# Patient Record
Sex: MALE | Race: AMERICAN INDIAN OR ALASKA NATIVE
[De-identification: names, ages, dates, MRNs, and addresses within clinical notes are randomized per-mention and may not be internally consistent; named-entity substitution may affect disease eponyms.]

---

## 2017-12-11 ENCOUNTER — HOSPITAL ENCOUNTER (INPATIENT)
Dept: HOSPITAL 31 - C.ER | Age: 22
LOS: 1 days | Discharge: HOME | DRG: 889 | End: 2017-12-12
Attending: HOSPITALIST | Admitting: HOSPITALIST
Payer: MEDICAID

## 2017-12-11 VITALS — RESPIRATION RATE: 20 BRPM

## 2017-12-11 VITALS — OXYGEN SATURATION: 98 %

## 2017-12-11 VITALS — BODY MASS INDEX: 37.1 KG/M2

## 2017-12-11 DIAGNOSIS — F80.9: ICD-10-CM

## 2017-12-11 DIAGNOSIS — R71.8: ICD-10-CM

## 2017-12-11 DIAGNOSIS — E87.6: ICD-10-CM

## 2017-12-11 DIAGNOSIS — Z91.14: ICD-10-CM

## 2017-12-11 DIAGNOSIS — F12.90: ICD-10-CM

## 2017-12-11 DIAGNOSIS — I49.9: ICD-10-CM

## 2017-12-11 DIAGNOSIS — F17.210: ICD-10-CM

## 2017-12-11 DIAGNOSIS — G40.409: Primary | ICD-10-CM

## 2017-12-11 DIAGNOSIS — Z79.899: ICD-10-CM

## 2017-12-11 LAB
ALBUMIN/GLOB SERPL: 1.4 {RATIO} (ref 1–2.1)
ALP SERPL-CCNC: 58 U/L (ref 38–126)
ALT SERPL-CCNC: 34 U/L (ref 21–72)
AST SERPL-CCNC: 27 U/L (ref 17–59)
BASE EXCESS BLDV CALC-SCNC: -2.7 MMOL/L (ref 0–2)
BASOPHILS # BLD AUTO: 0 K/UL (ref 0–0.2)
BASOPHILS NFR BLD: 0.3 % (ref 0–2)
BILIRUB SERPL-MCNC: 0.5 MG/DL (ref 0.2–1.3)
BILIRUB UR-MCNC: NEGATIVE MG/DL
BUN SERPL-MCNC: 14 MG/DL (ref 9–20)
BUN SERPL-MCNC: 15 MG/DL (ref 9–20)
CALCIUM SERPL-MCNC: 8.4 MG/DL (ref 8.6–10.4)
CALCIUM SERPL-MCNC: 8.7 MG/DL (ref 8.6–10.4)
CHLORIDE SERPL-SCNC: 107 MMOL/L (ref 98–107)
CHLORIDE SERPL-SCNC: 108 MMOL/L (ref 98–107)
CO2 SERPL-SCNC: 25 MMOL/L (ref 22–30)
CO2 SERPL-SCNC: 27 MMOL/L (ref 22–30)
EOSINOPHIL # BLD AUTO: 0.2 K/UL (ref 0–0.7)
EOSINOPHIL NFR BLD: 1.4 % (ref 0–4)
ERYTHROCYTE [DISTWIDTH] IN BLOOD BY AUTOMATED COUNT: 12 % (ref 11.5–14.5)
ETHANOL SERPL-MCNC: < 10 MG/DL (ref 0–10)
GLOBULIN SER-MCNC: 2.7 GM/DL (ref 2.2–3.9)
GLUCOSE SERPL-MCNC: 100 MG/DL (ref 75–110)
GLUCOSE SERPL-MCNC: 93 MG/DL (ref 75–110)
GLUCOSE UR STRIP-MCNC: NORMAL MG/DL
HCT VFR BLD CALC: 40.9 % (ref 35–51)
KETONES UR STRIP-MCNC: NEGATIVE MG/DL
LEUKOCYTE ESTERASE UR-ACNC: (no result) LEU/UL
LYMPHOCYTES # BLD AUTO: 2.3 K/UL (ref 1–4.3)
LYMPHOCYTES NFR BLD AUTO: 19.4 % (ref 20–40)
MCH RBC QN AUTO: 33.9 PG (ref 27–31)
MCHC RBC AUTO-ENTMCNC: 33.1 G/DL (ref 33–37)
MCV RBC AUTO: 102.2 FL (ref 80–94)
MONOCYTES # BLD: 0.3 K/UL (ref 0–0.8)
MONOCYTES NFR BLD: 3 % (ref 0–10)
NRBC BLD AUTO-RTO: 0.1 % (ref 0–2)
PCO2 BLDV: 40 MMHG (ref 40–60)
PH BLDV: 7.36 [PH] (ref 7.32–7.43)
PH UR STRIP: 8 [PH] (ref 5–8)
PLATELET # BLD: 311 K/UL (ref 130–400)
PMV BLD AUTO: 8.5 FL (ref 7.2–11.7)
POTASSIUM SERPL-SCNC: 2.9 MMOL/L (ref 3.6–5.2)
POTASSIUM SERPL-SCNC: 4.2 MMOL/L (ref 3.6–5.2)
PROT SERPL-MCNC: 6.5 G/DL (ref 6.3–8.3)
PROT UR STRIP-MCNC: NEGATIVE MG/DL
RBC # UR STRIP: NEGATIVE /UL
SODIUM SERPL-SCNC: 139 MMOL/L (ref 132–148)
SODIUM SERPL-SCNC: 143 MMOL/L (ref 132–148)
SP GR UR STRIP: 1.02 (ref 1–1.03)
UROBILINOGEN UR-MCNC: 4 MG/DL (ref 0.2–1)
WBC # BLD AUTO: 11.7 K/UL (ref 4.8–10.8)
WBC #/AREA URNS HPF: < 1 /HPF (ref 0–5)

## 2017-12-11 RX ADMIN — MAGNESIUM SULFATE IN DEXTROSE SCH MLS/HR: 10 INJECTION, SOLUTION INTRAVENOUS at 09:06

## 2017-12-11 RX ADMIN — MAGNESIUM SULFATE IN DEXTROSE SCH MLS/HR: 10 INJECTION, SOLUTION INTRAVENOUS at 11:41
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
## 2017-12-11 NOTE — CARD
--------------- APPROVED REPORT --------------





EKG Measurement

Heart [id number]

AL [id number]

[id number]

[id number]

[id number]



<Conclusion>

Sinus rhythm with marked sinus arrhythmia

Otherwise normal ECG

## 2017-12-11 NOTE — MRI
PROCEDURE:  Magnetic Resonance Angiography Brain



HISTORY:

Seizures



COMPARISON:

None available. 



TECHNIQUE:

3D time of flight MR angiography of the intracranial arteries was 

performed. Rotating maximum intensity projection images were 

generated.



FINDINGS:



INTERNAL CAROTID ARTERIES:

Normal in caliber and widely patent. The skull base, petrous, 

cavernous and supraclinoid segments are bilaterally widely patient. 



ANTERIOR CEREBRAL ARTERIES:

Normal in caliber and widely patent. A1 and A2 segments are widely 

patent. Smaller distal branches unremarkable, as visualized.



MIDDLE CEREBRAL ARTERIES:

Normal in caliber and widely patent. M1 and M2 segments are widely 

patent. Perisylvian branches grossly symmetric.



POSTERIOR CIRCULATION:

Basilar Artery: Normal in caliber and widely patent.



Distal Vertebral Arteries: Normal in caliber and widely patent.



Posterior Cerebral Arteries: Normal in caliber and widely patent.



Posterior Inferior Cerebellar Arteries: Normal in caliber and widely 

patent.



ANEURYSM/ VASCULAR MALFORMATIONS:

None.



OTHER FINDINGS:

None. 



IMPRESSION:

Normal MR angiography of the brain.

## 2017-12-11 NOTE — C.PDOC
History Of Present Illness


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 

collapse. Patient notes he was sick a few days ago and seen at a satellite ER 

for a complaint of dizziness and headache; he had a negative work up which 

included blood work and a head CT. On arrival to ER, patient is lethargic but 

able to answer questions appropriately; states he does not remember the event. 

While in the ER patient had a generalized tonic clonic seizure, 2ml of ativan 

administered. 


Time Seen by Provider: 12/11/17 01:56


Chief Complaint (Nursing): Syncope


History Per: Patient


History/Exam Limitations: no limitations


Onset/Duration Of Symptoms: Hrs


Current Symptoms Are (Timing): Still Present


Number Of Syncopal Episodes: 1


Activity At Onset Of Symptoms: Standing


Associated Symptoms Preceding Syncopal Episode: No Predromal Symptoms (Sudden 

Onset)


Seizure Or Post-ictal Symptoms: Generalized Seizure Activity, Post-ictal Period


Possible Causative Factor(s): Other (Not known)


Fall Associated With With Symptoms: Yes, No Injury As Result Of Fall


Severity: Moderate


Pain Scale Rating Of: 5


Recent travel outside of the [place]: No





- Symptoms Of CVA


Associated Symptoms: denies: Impaired Speech, Seizure Activity, New Vision 

Deficit(Left), New Vision Deficit(Right), Decreased Ability To Walk, New 

Confusion





Past Medical History


Reviewed: Historical Data, Nursing Documentation, Vital Signs


Vital Signs: 


 Last Vital Signs











Temp  97.5 F L  12/11/17 01:54


 


Pulse  64   12/11/17 03:03


 


Resp  16   12/11/17 03:03


 


BP  111/73   12/11/17 03:03


 


Pulse Ox  100   12/11/17 03:03














- Medical History


PMH: Seizures


Surgical History: No Surg Hx


Family History: States: No Known Family Hx





- Social History


Hx Alcohol Use: No


Hx Substance Use: No





- Immunization History


Hx Tetanus Toxoid Vaccination: No


Hx Influenza Vaccination: No


Hx Pneumococcal Vaccination: No





Review Of Systems


Constitutional: Positive for: Other (Lethargic).  Negative for: Fever, Chills


Eyes: Negative for: Redness


Cardiovascular: Negative for: Chest Pain


Respiratory: Negative for: Shortness of Breath


Gastrointestinal: Negative for: Vomiting


Genitourinary: Negative for: Incontinence


Musculoskeletal: Negative for: Back Pain


Skin: Negative for: Rash


Neurological: Positive for: Seizures


Psych: Negative for: Anxiety





Physical Exam





- Physical Exam


Appears: Non-toxic, Other (No evidence of trauma)


Skin: Warm, Dry


Head: Normacephalic


Eye(s): bilateral: Normal Inspection, PERRL, EOMI


Oral Mucosa: Moist


Neck: Trachea Midline, Supple


Chest: Symmetrical, No Tenderness


Cardiovascular: Rhythm Regular


Respiratory: No Rales, No Rhonchi, No Wheezing


Gastrointestinal/Abdominal: Soft, No Tenderness


Back: Normal Inspection


Extremity: Normal ROM


Extremity: Bilateral: Atraumatic


Pulses: Left Dorsalis Pedis: Normal, Right Dorsalis Pedis: Normal


Neurological/Psych: Oriented x3, Slow To Respond With Command


Gait: Unable To Assess





ED Course And Treatment





- Laboratory Results


Result Diagrams: 


 12/11/17 02:16





 12/11/17 02:46


ECG: Interpreted By Me, Viewed By Me


ECG Rhythm: Sinus Rhythm (66), Nonspecific Changes


O2 Sat by Pulse Oximetry: 97


Pulse Ox Interpretation: Normal





- Radiology


CXR: Interpreted by Me, Viewed By Me


CXR Interpretation: No: Infiltrates, Fracture, Pnemothorax


Progress Note: Blood work, urinalysis, CXR, and EKG ordered. IV fluids 

administered.





Critical Care Time





- Critical Care Note


Total Time (in mins): 30


Documented critical care: time excludes all time spent performing seperately 

billable procedures.





Disposition


Discussed With DrRoberta: William Nugent


Comment: accepted the pt on his servoice and took over the care at 3:46 AM


Doctor Will See Patient In The: ED


Counseled Patient/Family Regarding: Studies Performed, Diagnosis





- Disposition


Disposition: HOSPITALIZED


Disposition Time: 01:56


Condition: FAIR


Forms:  [facility] (English)





- POA


Present On Arrival: None





- Clinical Impression


Clinical Impression: 


 Dizziness, Seizure








- Scribe Statement


The provider has reviewed the documentation as recorded by the Scribe





Mario Munoz





All medical record entries made by the Scribe were at my direction and 

personally dictated by me. I have reviewed the chart and agree that the record 

accurately reflects my personal performance of the history, physical exam, 

medical decision making, and the department course for this patient. I have 

also personally directed, reviewed, and agree with the discharge instructions 

and disposition.





Decision To Admit





- Pt Status Changed To:


Hospital Disposition Of: Inpatient





- Admit Certification


Admit to Inpatient:: After my assessment, the patient will require 

hospitalization for at least two midnights.  This is because of the severity of 

symptoms shown, intensity of services needed, and/or the medical risk in this 

patient being treated as an outpatient.





- InPatient:


Physician Admission Certification: I certify that this patient requires 2 or 

more midnights of care for the following reason:: After my assessment, the 

patient will require hospitalization for at least two midnights.  This is 

because of the severity of symptoms shown, intensity of services needed, and/or 

the medical risk in this patient being treated as an outpatient.





- .


Bed Request Type: Regular


Admitting Physician: William Nugent


Patient Diagnosis: 


 Dizziness, Seizure
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
## 2017-12-11 NOTE — MRI
PROCEDURE:  MRI BRAIN WITHOUT CONTRAST



HISTORY:

seizure



COMPARISON:

Noncontrast head CT from 12/11/2017. 



TECHNIQUE:

Multiplanar, multisequence MR images of the brain were obtained 

without intravenous contrast enhancement.



FINDINGS:



HEMORRHAGE:

None



DWI:

No evidence of an acute or early subacute infarction.



BRAIN PARENCHYMA:

Gray-white matter differentiation is preserved.  There is no mass, 

mass effect or abnormal extra-axial fluid collection. The midline 

sagittal structures are normal. There is no evidence of mesial 

temporal sclerosis. 



VENTRICLES:

The ventricles are normal in size, shape and configuration.



CRANIUM:

There is normal bone marrow signal pattern.



ORBITS:

Grossly unremarkable.



PARANASAL SINUSES/MASTOIDS:

There is mild mucosal thickening in the paranasal sinuses.  The 

mastoid air cells are clear P



VASCULAR SYSTEM:

There are normal signal voids in the larger intracranial arteries.



OTHER FINDINGS:

None. 



IMPRESSION:

No acute intracranial abnormality.

## 2017-12-11 NOTE — RAD
PROCEDURE:  CHEST RADIOGRAPH, 1 VIEW



HISTORY:

Seizure



COMPARISON:

09/11/2015.



FINDINGS:



LUNGS:

The lungs are well inflated and clear.



PLEURA:

No pneumothorax or pleural fluid seen.



CARDIOVASCULAR:

Normal.



OSSEOUS STRUCTURES:

No significant abnormalities.



VISUALIZED UPPER ABDOMEN:

Normal.



OTHER FINDINGS:

None. 



IMPRESSION:

No active pulmonary disease.

## 2017-12-11 NOTE — CT
PROCEDURE:  CT HEAD WITHOUT CONTRAST.



HISTORY:

seizure, fall



COMPARISON:

None available. 



TECHNIQUE:

Axial computed tomography images were obtained through the head/brain 

without intravenous contrast.  



Radiation dose:



Total exam DLP = 1016 mGy-cm.



This CT exam was performed using one or more of the following dose 

reduction techniques: Automated exposure control, adjustment of the 

mA and/or kV according to patient size, and/or use of iterative 

reconstruction technique.



FINDINGS:



HEMORRHAGE:

No intracranial hemorrhage. 



BRAIN:

No mass effect or edema.  No atrophy or chronic microvascular 

ischemic changes.



VENTRICLES:

Unremarkable. No hydrocephalus. 



CALVARIUM:

Unremarkable.



PARANASAL SINUSES:

Small left frontal sinus retention cyst or polyp.



MASTOID AIR CELLS:

Unremarkable as visualized. No inflammatory changes.



OTHER FINDINGS:

Opacities in the bilateral external auditory canals which may be 

cerumen which can be confirmed clinically.



IMPRESSION:

No evidence for acute intracranial abnormality.



Additional findings as above.



These findings were preliminarily reported at 6:30 a.m. 12/11/2017 by 

Dr. Chen Bone from [facility].

## 2017-12-12 VITALS — TEMPERATURE: 97.9 F | SYSTOLIC BLOOD PRESSURE: 115 MMHG | HEART RATE: 55 BPM | DIASTOLIC BLOOD PRESSURE: 65 MMHG

## 2017-12-12 LAB
ALBUMIN/GLOB SERPL: 1 {RATIO} (ref 1–2.1)
ALP SERPL-CCNC: 60 U/L (ref 38–126)
ALT SERPL-CCNC: 29 U/L (ref 21–72)
AST SERPL-CCNC: 21 U/L (ref 17–59)
BASOPHILS # BLD AUTO: 0 K/UL (ref 0–0.2)
BASOPHILS NFR BLD: 0.3 % (ref 0–2)
BILIRUB SERPL-MCNC: 0.5 MG/DL (ref 0.2–1.3)
BUN SERPL-MCNC: 11 MG/DL (ref 9–20)
CALCIUM SERPL-MCNC: 8.8 MG/DL (ref 8.6–10.4)
CHLORIDE SERPL-SCNC: 108 MMOL/L (ref 98–107)
CO2 SERPL-SCNC: 30 MMOL/L (ref 22–30)
EOSINOPHIL # BLD AUTO: 0.1 K/UL (ref 0–0.7)
EOSINOPHIL NFR BLD: 0.4 % (ref 0–4)
ERYTHROCYTE [DISTWIDTH] IN BLOOD BY AUTOMATED COUNT: 12 % (ref 11.5–14.5)
GLOBULIN SER-MCNC: 3.8 GM/DL (ref 2.2–3.9)
GLUCOSE SERPL-MCNC: 81 MG/DL (ref 75–110)
HCT VFR BLD CALC: 40.1 % (ref 35–51)
LYMPHOCYTES # BLD AUTO: 2.2 K/UL (ref 1–4.3)
LYMPHOCYTES NFR BLD AUTO: 14.3 % (ref 20–40)
MAGNESIUM SERPL-MCNC: 1.7 MG/DL (ref 1.6–2.3)
MCH RBC QN AUTO: 33.6 PG (ref 27–31)
MCHC RBC AUTO-ENTMCNC: 32.7 G/DL (ref 33–37)
MCV RBC AUTO: 102.7 FL (ref 80–94)
MONOCYTES # BLD: 0.8 K/UL (ref 0–0.8)
MONOCYTES NFR BLD: 5.2 % (ref 0–10)
NRBC BLD AUTO-RTO: 0 % (ref 0–2)
PHOSPHATE SERPL-MCNC: 3.6 MG/DL (ref 2.5–4.5)
PLATELET # BLD: 295 K/UL (ref 130–400)
PMV BLD AUTO: 8.7 FL (ref 7.2–11.7)
POTASSIUM SERPL-SCNC: 3.9 MMOL/L (ref 3.6–5.2)
PROT SERPL-MCNC: 7.5 G/DL (ref 6.3–8.3)
SODIUM SERPL-SCNC: 139 MMOL/L (ref 132–148)
WBC # BLD AUTO: 15.5 K/UL (ref 4.8–10.8)

## 2017-12-12 NOTE — CP.PCM.CON
History of Present Illness





- History of Present Illness


History of Present Illness: 





CONSULT DICTATED 


REC SEIZURE - NON COMPLIANCE


RESUME KEPPRA 750 MG BID 


NEEDS AMB VIDEO EEG AS AN OP 


MED STABLE D/C AND FOLLOW UP AS AN OP 


ABSTINENCE TOB AND CANABINOIDS





Past Patient History





- Infectious Disease


Hx of Infectious Diseases: None





- Past Medical History & Family History


Past Medical History?: Yes





- Past Social History


Smoking Status: Heavy Smoker > 10 Cigarettes Daily





- CARDIAC


Hx Cardiac Disorders: No


Hx Hypertension: No





- PULMONARY


Hx Respiratory Disorders: No


Hx Tuberculosis: No





- NEUROLOGICAL


Hx Neurological Disorder: Yes


Hx Seizures: Yes





- HEENT


Hx HEENT Problems: No





- RENAL


Hx Chronic Kidney Disease: No





- ENDOCRINE/METABOLIC


Hx Endocrine Disorders: No





- HEMATOLOGICAL/ONCOLOGICAL


Hx Blood Disorders: No


Hx Human Immunodeficiency Virus (HIV): No





- INTEGUMENTARY


Hx Dermatological Problems: No





- MUSCULOSKELETAL/RHEUMATOLOGICAL


Hx Musculoskeletal Disorders: No


Hx Falls: No





- GASTROINTESTINAL


Hx Gastrointestinal Disorders: No





- GENITOURINARY/GYNECOLOGICAL


Hx Genitourinary Disorders: No


Hx Sexually Transmitted Disorders: No





- PSYCHIATRIC


Hx Psychophysiologic Disorder: No


Hx Substance Use: Yes (freddie)





- SURGICAL HISTORY


Hx Surgeries: Yes


Other/Comment: jaw surgiries.  chest tube





- ANESTHESIA


Hx Anesthesia: Yes


Hx Anesthesia Reactions: No


Hx Malignant Hyperthermia: No





Meds


Allergies/Adverse Reactions: 


 Allergies











Allergy/AdvReac Type Severity Reaction Status Date / Time


 


EGG Allergy   Verified 12/11/17 02:01














- Medications


Medications: 


 Current Medications





Famotidine (Pepcid)  20 mg PO BID [facility]


   Last Admin: 12/11/17 18:46 Dose:  20 mg


Levetiracetam 750 mg/ Sodium (Chloride)  107.5 mls @ 420 mls/hr IVPB Q12H [facility]


   Last Admin: 12/12/17 05:20 Dose:  420 mls/hr


Lorazepam (Ativan)  1 mg IVP Q6H PRN


   PRN Reason: Seizure activity











Results





- Vital Signs


Recent Vital Signs: 


 Last Vital Signs











Temp  97.5 F L  12/11/17 23:10


 


Pulse  56 L  12/12/17 01:53


 


Resp  20   12/11/17 23:10


 


BP  123/54 L  12/11/17 23:10


 


Pulse Ox  98   12/11/17 23:10














- Labs


Result Diagrams: 


 12/11/17 02:16





 12/11/17 11:08


Labs: 


 Laboratory Results - last 24 hr











  12/11/17 12/11/17 12/11/17





  11:08 13:27 13:27


 


Sodium  139  


 


Potassium  4.2  


 


Chloride  107  


 


Carbon Dioxide  27  


 


Anion Gap  10  


 


BUN  14  


 


Creatinine  0.7 L  


 


Est GFR ( Amer)  > 60  


 


Est GFR (Non-Af Amer)  > 60  


 


Random Glucose  100  


 


Calcium  8.7  


 


Urine Color   Yellow 


 


Urine Clarity   Clear 


 


Urine pH   8.0 


 


Ur Specific Gravity   1.016 


 


Urine Protein   Negative 


 


Urine Glucose (UA)   Normal 


 


Urine Ketones   Negative 


 


Urine Blood   Negative 


 


Urine Nitrate   Negative 


 


Urine Bilirubin   Negative 


 


Urine Urobilinogen   4.0 


 


Ur Leukocyte Esterase   Neg 


 


Urine WBC (Auto)   < 1 


 


Ur Squamous Epith Cells   < 1 


 


Urine Opiates Screen    Negative


 


Urine Methadone Screen    Negative


 


Ur Barbiturates Screen    Negative


 


Ur Phencyclidine Scrn    Negative


 


Ur Amphetamines Screen    Negative


 


U Benzodiazepines Scrn    Negative


 


U Oth Cocaine Metabols    Negative


 


U Cannabinoids Screen    Positive H

## 2017-12-12 NOTE — CON
DATE:  12/12/2017



REASON FOR THE CONSULTATION:  Seizure.



CHIEF COMPLAINT:  The patient was brought in to [facility] with a

change in mental status with witnessed seizure in the emergency room.



HISTORY OF PRESENT ILLNESS:  Mr. Silver Kumar is a 22-year-old

right-handed -American male.  At job, the patient was found change

in mental status, about to collapse on the floor.  He also carries a

history of seizures for the last 4 months.  He never seen a neurologist. 

He is supposed to take the medication; however, he is busy, he is not

taking medication as scheduled.  He also carries a history of cannabis

abuse.



Last seizure was happened yesterday in the emergency room.



PAST MEDICAL HISTORY:  Unremarkable except seizure disorder.



PERSONAL HISTORY:  History of substance abuse, marijuana, alcohol use. 

Usually smoke 10 cigarettes per day.



REVIEW OF SYSTEMS:  Twelve-point systems had been reviewed.  From neuro,

seizures.



PHYSICAL EXAMINATION:

VITAL SIGNS:  Blood pressure 123/74, mean arterial pressure of 86,

respiratory rate 16, temperature afebrile.

NECK:  Supple.  No carotid bruit.

HEART:  Heart sounds are regular.

CHEST:  Fair air entry.

EXTREMITIES:  No edema in legs

NEUROLOGICAL:  Mental status examination:  He is awake, alert, oriented to

person, place and time.  Speech is clear.  Naming, repetition, fluency,

comprehension all within normal.  Cranial nerve examination:  Visual field

intact.  Pupils react to light.  Extraocular movement normal.  No

nystagmus.  No facial sensory deficit.  No facial asymmetry.  Hearing is

normal.  Tongue is midline.  Good gag.  Motor examination:  On outstretched

hand with eyes closed, no drift noted.  Power is symmetric on either side. 

Deep tendon reflexes biceps, brachialis, triceps 2+ on either side.  Both

knees are 1+.  Both ankles are trace.  Plantars are downgoing.  Sensory

examination grossly intact.  Coordination:  Finger-nose-finger test is

intact.  Gait is normal.



WORKUP:  WBC 11.7, hemoglobin 13.6, hematocrit 40.9, platelet 311.  Sodium

139, potassium 4.2, chloride 107, bicarbonate 27, BUN 14, creatinine 0.7

with the GFR more than 60, glucose 100, calcium 8.7.  Urine tox screen

shows cannabinoids positive.



His workup, MRI of the brain, MRA all been done have been reviewed, which

does not show any focality.



CONCLUSION:  Mr. Silver Kumar had been presenting with seizure disorder,

which is probably secondary to his noncompliance with the antiepileptic

drugs.



RECOMMENDATIONS:

1.  The patient is requested to continue Keppra 750 mg twice a day.

2.  Suggest abstinence from substance abuse and smoking.

3.  The patient should have a followup visit with me as outpatient.  The

patient may need ambulatory electroencephalogram, which can be done as

outpatient.  His current EEG will be reviewed.  However, the plan of

treatment is not going to be changed.  If medically stable, the patient can

be discharged.



__________________________________________

Ye Angela MD





DD:  12/12/2017 7:25:37

DT:  12/12/2017 7:31:00

Job # [id number]

## 2017-12-12 NOTE — CON
CARDIOLOGY CONSULT



REQUESTED BY:  William Nugent MD



LOCATION:  At [address]



HISTORY OF PRESENT ILLNESS:  Requested to see this 22-year-old 

American male who was admitted after two seizure episodes as well as some

cardiac arrhythmia.



At this point in time, he appears totally well.  He feels well.  He is in

bed, watching TV.  Denied any problems whatsoever.



Apparently, on admission, there was a significant hypokalemia at 2.9, this

has been corrected; there is some indications of some microcytosis in the

CBC, which makes me began questioning about alcohol use.



He told me that he has been drinking beers, lots of them recently and he

was drinking several days prior to this episode because one of the brother

was getting .



His cardiac history is relatively benign and his electrocardiogram on

admission actually shows sinus arrhythmia, which is normal for age.  No

evidence of any complex arrhythmias on telemetry, he has been unremarkable.



PAST MEDICAL HISTORY:  Includes his seizure disorder for which he has been

taking Keppra for quite sometime, but he claimed that despite of this, he

occasionally gets seizure episode and the reason why he gets frustrated and

then he began drinking; this would be addressed down the road.



SOCIAL HISTORY:  He smokes some marijuana occasionally, he was doing so

over the last several days also.  He denies any other illicit drugs.



PHYSICAL EXAMINATION:

GENERAL:  Reveals a young adult, black male, very pleasant, cooperative, in

no distress whatsoever.

VITAL SIGNS:  Vital signs are totally stable at this point in time.  Blood

pressure is normal.  Continuous cardiac monitoring shows sinus rhythm.  No

complex ectopy here.  Respiratory rate is unremarkable.

CARDIOVASCULAR:  From the cardiologic viewpoint, the precordium is

unremarkable.  No thrills.  Heart sounds normal in intensity and regular. 

Minimal systolic murmur, insignificant.

LUNGS:  Totally clear to auscultation.

NECK:  Supple.  No thyromegaly and no jugular venous distention.  Carotids

are normal.

ABDOMEN:  Unremarkable.

SKIN:  Shows multiple tattoos just about everywhere, hands, arms etc.  No

dependent edema.



IMPRESSION:

1.  Hypokalemia secondary to excessive drinking over the last few days,

corrected by now.

2.  No significant cardiac issues at this point.





__________________________________________

Roosevelt Mccracken MD





DD:  12/11/2017 17:50:20

DT:  12/12/2017 1:34:03

Job # [id number]

## 2017-12-12 NOTE — CP.PCM.DIS
<Keyana Bullard E - Last Filed: 12/12/17 11:17>





Provider





- Provider


Date of Admission: 


12/11/17 03:45





Attending physician: 


Jaren Gale DO





Time Spent in preparation of Discharge (in minutes): 35





Diagnosis





- Discharge Diagnosis


(1) Seizure


Status: Acute   





(2) Sinus arrhythmia seen on electrocardiogram


Status: Acute   





(3) Hypokalemia


Status: Acute   





(4) Prophylactic measure


Status: Acute   





Hospital Course





- Lab Results


Lab Results: 


 Most Recent Lab Values











WBC  15.5 K/uL (4.8-10.8)  H  12/12/17  07:17    


 


RBC  3.90 Mil/uL (4.40-5.90)  L  12/12/17  07:17    


 


Hgb  13.1 g/dL (12.0-18.0)   12/12/17  07:17    


 


Hct  40.1 % (35.0-51.0)   12/12/17  07:17    


 


MCV  102.7 fL (80.0-94.0)  H  12/12/17  07:17    


 


MCH  33.6 pg (27.0-31.0)  H  12/12/17  07:17    


 


MCHC  32.7 g/dL (33.0-37.0)  L  12/12/17  07:17    


 


RDW  12.0 % (11.5-14.5)   12/12/17  07:17    


 


Plt Count  295 K/uL (130-400)   12/12/17  07:17    


 


MPV  8.7 fL (7.2-11.7)   12/12/17  07:17    


 


Neut % (Auto)  79.8 % (50.0-75.0)  H  12/12/17  07:17    


 


Lymph % (Auto)  14.3 % (20.0-40.0)  L  12/12/17  07:17    


 


Mono % (Auto)  5.2 % (0.0-10.0)   12/12/17  07:17    


 


Eos % (Auto)  0.4 % (0.0-4.0)   12/12/17  07:17    


 


Baso % (Auto)  0.3 % (0.0-2.0)   12/12/17  07:17    


 


Neut #  12.4 K/uL (1.8-7.0)  H  12/12/17  07:17    


 


Lymph #  2.2 K/uL (1.0-4.3)   12/12/17  07:17    


 


Mono #  0.8 K/uL (0.0-0.8)   12/12/17  07:17    


 


Eos #  0.1 K/uL (0.0-0.7)   12/12/17  07:17    


 


Baso #  0.0 K/uL (0.0-0.2)   12/12/17  07:17    


 


pO2  49 mm/Hg (30-55)   12/11/17  02:23    


 


VBG pH  7.36  (7.32-7.43)   12/11/17  02:23    


 


VBG pCO2  40 mmHg (40-60)   12/11/17  02:23    


 


VBG HCO3  22.4 mmol/L  12/11/17  02:23    


 


VBG Total CO2  23.8 mmol/L (22-28)   12/11/17  02:23    


 


VBG O2 Sat (Calc)  86.1 % (40-65)  H  12/11/17  02:23    


 


VBG Base Excess  -2.7 mmol/L (0.0-2.0)  L  12/11/17  02:23    


 


VBG Potassium  3.3 mmol/L (3.6-5.2)  L  12/11/17  02:23    


 


Sodium  144.0 mmol/l (132-148)   12/11/17  02:23    


 


Chloride  113.0 mmol/L ()  H  12/11/17  02:23    


 


Glucose  72 mg/dl ()  L  12/11/17  02:23    


 


Lactate  1.8 mmol/L (0.7-2.1)   12/11/17  02:23    


 


Sodium  139 mmol/L (132-148)   12/12/17  07:17    


 


Potassium  3.9 mmol/L (3.6-5.2)   12/12/17  07:17    


 


Chloride  108 mmol/L ()  H  12/12/17  07:17    


 


Carbon Dioxide  30 mmol/L (22-30)   12/12/17  07:17    


 


Anion Gap  6  (10-20)  L  12/12/17  07:17    


 


BUN  11 mg/dL (9-20)   12/12/17  07:17    


 


Creatinine  0.7 mg/dL (0.8-1.5)  L  12/12/17  07:17    


 


Est GFR ( Amer)  > 60   12/12/17  07:17    


 


Est GFR (Non-Af Amer)  > 60   12/12/17  07:17    


 


Random Glucose  81 mg/dL ()   12/12/17  07:17    


 


Calcium  8.8 mg/dl (8.6-10.4)   12/12/17  07:17    


 


Phosphorus  3.6 mg/dL (2.5-4.5)   12/12/17  07:17    


 


Magnesium  1.7 mg/dL (1.6-2.3)   12/12/17  07:17    


 


Total Bilirubin  0.5 mg/dL (0.2-1.3)   12/12/17  07:17    


 


AST  21 U/L (17-59)   12/12/17  07:17    


 


ALT  29 U/L (21-72)   12/12/17  07:17    


 


Alkaline Phosphatase  60 U/L ()   12/12/17  07:17    


 


Total Protein  7.5 g/dL (6.3-8.3)   12/12/17  07:17    


 


Albumin  3.8 g/dL (3.5-5.0)   12/12/17  07:17    


 


Globulin  3.8 gm/dL (2.2-3.9)   12/12/17  07:17    


 


Albumin/Globulin Ratio  1.0  (1.0-2.1)   12/12/17  07:17    


 


Prolactin  23.6 ng/mL (3.7-17.9)  H  12/12/17  07:17    


 


Venous Blood Potassium  3.3 mmol/L (3.6-5.2)  L  12/11/17  02:23    


 


Urine Color  Yellow  (YELLOW)   12/11/17  13:27    


 


Urine Clarity  Clear  (Clear)   12/11/17  13:27    


 


Urine pH  8.0  (5.0-8.0)   12/11/17  13:27    


 


Ur Specific Gravity  1.016  (1.003-1.030)   12/11/17  13:27    


 


Urine Protein  Negative mg/dL (NEGATIVE)   12/11/17  13:27    


 


Urine Glucose (UA)  Normal mg/dL (Normal)   12/11/17  13:27    


 


Urine Ketones  Negative mg/dL (NEGATIVE)   12/11/17  13:27    


 


Urine Blood  Negative  (NEGATIVE)   12/11/17  13:27    


 


Urine Nitrate  Negative  (NEGATIVE)   12/11/17  13:27    


 


Urine Bilirubin  Negative  (NEGATIVE)   12/11/17  13:27    


 


Urine Urobilinogen  4.0 mg/dL (0.2-1.0)   12/11/17  13:27    


 


Ur Leukocyte Esterase  Neg Lisa/uL (Negative)   12/11/17  13:27    


 


Urine WBC (Auto)  < 1 /hpf (0-5)   12/11/17  13:27    


 


Ur Squamous Epith Cells  < 1 /hpf (0-5)   12/11/17  13:27    


 


Urine Opiates Screen  Negative  (NEGATIVE)   12/11/17  13:27    


 


Urine Methadone Screen  Negative  (NEGATIVE)   12/11/17  13:27    


 


Ur Barbiturates Screen  Negative  (NEGATIVE)   12/11/17  13:27    


 


Ur Phencyclidine Scrn  Negative  (NEGATIVE)   12/11/17  13:27    


 


Ur Amphetamines Screen  Negative  (NEGATIVE)   12/11/17  13:27    


 


U Benzodiazepines Scrn  Negative  (NEGATIVE)   12/11/17  13:27    


 


U Oth Cocaine Metabols  Negative  (NEGATIVE)   12/11/17  13:27    


 


U Cannabinoids Screen  Positive  (NEGATIVE)  H  12/11/17  13:27    


 


Alcohol, Quantitative  < 10 mg/dl (0-10)   12/11/17  02:46    














- Hospital Course


Hospital Course: 


HPI (As per admission):


Patient is a 22M with PMH of seizures who presents to the ED via coworkers for 

seizure while at work this morning. According to ED staff, patient was working 

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 

Patient's coworkers are no longer at the ED and further history could not be 

obtained from them. However, while in the ED patient had another tonic clonic 

seizure and was given 2 mg of ativan. Patient is confused during H&P and 

difficult to elicit answers from but he says the last thing he remembers is 

going to works this morning. At the beginning of the exam patient cannot 

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 

works in sanitation. Patient says he was recently at [facility] and 

[facility] [place] for dizziness, headache, and 1 episode of vomiting but he has felt ok 

since then. Patient admits to being inconsistent with taking his Keppra. He 

says the last time he took it was a couple of days ago. Patient admits to and 

aura before he has the seizures and "sees lights". Patient is now complaining 

of an occipital headache but otherwise denies fever, chills, chest pain, SOB, 

abdominal pain, n/v/d, dysuria, and blood in urine/stool/vomit. 





Hospital Course: 


Patient was admitted with the diagnosis of seizures and hypokalemia. Patient's 

hypokalemia was addressed with IVPB KCL and was repleted appropriately. 

Cardiologist, Dr. Mccracken was consulted for due to noted sinus arrhythmia on EKG, 

who evaluated the patient and determined there were no cardiac issues at this 

point, therefore, no recommendations. Neurologist, Dr. Angela was consulted, who 

recommended an EEG outpatient and will follow up pending inpatient EEG result. 

When seen today, patient was educated on alcohol toxicity and effect on his 

past medical history of seizure. During the course of admission, patient had no 

acute issues or seizure episode. Patient was discharge upon clearance by his 

medical team with appropriate discharge instructions. 





Pertinent Imaging:


Head CT:No evidence for acute intracranial abnormality. No mass effect or 

edema. No atrophy or chronic microvascular ischemic changes.


Brain MRI: No acute intracranial abnormality.No evidence of an acute or early 

subacute infarction.Gray-white matter differentiation is preserved.  There is 

no mass, mass effect or abnormal extra-axial fluid collection. The midline 

sagittal structures are normal. There is no evidence of mesial temporal 

sclerosis


Head MRA: Normal MR angiography of the brain.


Neck MRA:Normal MR Angiography of the neck


Chest X-ray: No active disease 


EKG: Sinus Arrhythmias





This is brief summary events. For a complete course, please refer to the 

medical records. 














Discharge Exam





- Head Exam


Head Exam: ATRAUMATIC





- Eye Exam


Eye Exam: EOMI, Normal appearance





- ENT Exam


ENT Exam: Mucous Membranes Moist





- Respiratory Exam


Respiratory Exam: Clear to PA & Lateral, NORMAL BREATHING PATTERN





- Cardiovascular Exam


Cardiovascular Exam: REGULAR RHYTHM, +S1, +S2





- GI/Abdominal Exam


GI & Abdominal Exam: Normal Bowel Sounds, Soft.  absent: Diminished Bowel Sounds

, Distended, Firm, Guarding





- Extremities Exam


Extremities exam: normal inspection





- Neurological Exam


Neurological exam: Alert, Oriented x3





- Psychiatric Exam


Psychiatric exam: Normal Affect





- Skin


Skin Exam: Normal Color





Discharge Plan





- Discharge Medications


Prescriptions: 


Keppra 750 mg PO BID 30 Days #60





- Follow Up Plan


Condition: FAIR


Disposition: HOME/ ROUTINE


Instructions:  Hypokalemia (DC), Hypokalemia (GEN), Dizziness (GEN), Recurrent 

Seizures in Adults (DC), Recurrent Seizures in Adults (GEN), New-Onset Seizure 

in Adults (DC), New-Onset Seizure in Adults (GEN)


Additional Instructions: 


Please discharge patient home as per Dr. Gale 


Please continue your home medications and take as prescribed:


1. Keppra 750mg PO BID 


Please follow up with an adult physician and not your pediatrician, or if you 

are unable to find an adult physician, you can follow up with the [facility] at [facility] and [facility],  [phone]


Please follow up with Dr. Angela, neurologist as he has discussed with you


Please return to the hospital and ED if symptoms resume or worsen


Please try to avoid chronic alcohol. 





Referrals: 


[facility] [Provider Group]


Ye Angela MD [Staff Provider] - 


Roosevelt Mccracken MD [Staff Provider] - 





<Jaren Gale - Last Filed: 12/12/17 13:56>





Provider





- Provider


Date of Admission: 


12/11/17 03:45





Attending physician: 


Jaren Gale, [facility]





- Lab Results


Lab Results: 


 Most Recent Lab Values











WBC  15.5 K/uL (4.8-10.8)  H  12/12/17  07:17    


 


RBC  3.90 Mil/uL (4.40-5.90)  L  12/12/17  07:17    


 


Hgb  13.1 g/dL (12.0-18.0)   12/12/17  07:17    


 


Hct  40.1 % (35.0-51.0)   12/12/17  07:17    


 


MCV  102.7 fL (80.0-94.0)  H  12/12/17  07:17    


 


MCH  33.6 pg (27.0-31.0)  H  12/12/17  07:17    


 


MCHC  32.7 g/dL (33.0-37.0)  L  12/12/17  07:17    


 


RDW  12.0 % (11.5-14.5)   12/12/17  07:17    


 


Plt Count  295 K/uL (130-400)   12/12/17  07:17    


 


MPV  8.7 fL (7.2-11.7)   12/12/17  07:17    


 


Neut % (Auto)  79.8 % (50.0-75.0)  H  12/12/17  07:17    


 


Lymph % (Auto)  14.3 % (20.0-40.0)  L  12/12/17  07:17    


 


Mono % (Auto)  5.2 % (0.0-10.0)   12/12/17  07:17    


 


Eos % (Auto)  0.4 % (0.0-4.0)   12/12/17  07:17    


 


Baso % (Auto)  0.3 % (0.0-2.0)   12/12/17  07:17    


 


Neut #  12.4 K/uL (1.8-7.0)  H  12/12/17  07:17    


 


Lymph #  2.2 K/uL (1.0-4.3)   12/12/17  07:17    


 


Mono #  0.8 K/uL (0.0-0.8)   12/12/17  07:17    


 


Eos #  0.1 K/uL (0.0-0.7)   12/12/17  07:17    


 


Baso #  0.0 K/uL (0.0-0.2)   12/12/17  07:17    


 


pO2  49 mm/Hg (30-55)   12/11/17  02:23    


 


VBG pH  7.36  (7.32-7.43)   12/11/17  02:23    


 


VBG pCO2  40 mmHg (40-60)   12/11/17  02:23    


 


VBG HCO3  22.4 mmol/L  12/11/17  02:23    


 


VBG Total CO2  23.8 mmol/L (22-28)   12/11/17  02:23    


 


VBG O2 Sat (Calc)  86.1 % (40-65)  H  12/11/17  02:23    


 


VBG Base Excess  -2.7 mmol/L (0.0-2.0)  L  12/11/17  02:23    


 


VBG Potassium  3.3 mmol/L (3.6-5.2)  L  12/11/17  02:23    


 


Sodium  144.0 mmol/l (132-148)   12/11/17  02:23    


 


Chloride  113.0 mmol/L ()  H  12/11/17  02:23    


 


Glucose  72 mg/dl ()  L  12/11/17  02:23    


 


Lactate  1.8 mmol/L (0.7-2.1)   12/11/17  02:23    


 


Sodium  139 mmol/L (132-148)   12/12/17  07:17    


 


Potassium  3.9 mmol/L (3.6-5.2)   12/12/17  07:17    


 


Chloride  108 mmol/L ()  H  12/12/17  07:17    


 


Carbon Dioxide  30 mmol/L (22-30)   12/12/17  07:17    


 


Anion Gap  6  (10-20)  L  12/12/17  07:17    


 


BUN  11 mg/dL (9-20)   12/12/17  07:17    


 


Creatinine  0.7 mg/dL (0.8-1.5)  L  12/12/17  07:17    


 


Est GFR ( Amer)  > 60   12/12/17  07:17    


 


Est GFR (Non-Af Amer)  > 60   12/12/17  07:17    


 


Random Glucose  81 mg/dL ()   12/12/17  07:17    


 


Calcium  8.8 mg/dl (8.6-10.4)   12/12/17  07:17    


 


Phosphorus  3.6 mg/dL (2.5-4.5)   12/12/17  07:17    


 


Magnesium  1.7 mg/dL (1.6-2.3)   12/12/17  07:17    


 


Total Bilirubin  0.5 mg/dL (0.2-1.3)   12/12/17  07:17    


 


AST  21 U/L (17-59)   12/12/17  07:17    


 


ALT  29 U/L (21-72)   12/12/17  07:17    


 


Alkaline Phosphatase  60 U/L ()   12/12/17  07:17    


 


Total Protein  7.5 g/dL (6.3-8.3)   12/12/17  07:17    


 


Albumin  3.8 g/dL (3.5-5.0)   12/12/17  07:17    


 


Globulin  3.8 gm/dL (2.2-3.9)   12/12/17  07:17    


 


Albumin/Globulin Ratio  1.0  (1.0-2.1)   12/12/17  07:17    


 


Prolactin  23.6 ng/mL (3.7-17.9)  H  12/12/17  07:17    


 


Venous Blood Potassium  3.3 mmol/L (3.6-5.2)  L  12/11/17  02:23    


 


Urine Color  Yellow  (YELLOW)   12/11/17  13:27    


 


Urine Clarity  Clear  (Clear)   12/11/17  13:27    


 


Urine pH  8.0  (5.0-8.0)   12/11/17  13:27    


 


Ur Specific Gravity  1.016  (1.003-1.030)   12/11/17  13:27    


 


Urine Protein  Negative mg/dL (NEGATIVE)   12/11/17  13:27    


 


Urine Glucose (UA)  Normal mg/dL (Normal)   12/11/17  13:27    


 


Urine Ketones  Negative mg/dL (NEGATIVE)   12/11/17  13:27    


 


Urine Blood  Negative  (NEGATIVE)   12/11/17  13:27    


 


Urine Nitrate  Negative  (NEGATIVE)   12/11/17  13:27    


 


Urine Bilirubin  Negative  (NEGATIVE)   12/11/17  13:27    


 


Urine Urobilinogen  4.0 mg/dL (0.2-1.0)   12/11/17  13:27    


 


Ur Leukocyte Esterase  Neg Lisa/uL (Negative)   12/11/17  13:27    


 


Urine WBC (Auto)  < 1 /hpf (0-5)   12/11/17  13:27    


 


Ur Squamous Epith Cells  < 1 /hpf (0-5)   12/11/17  13:27    


 


Urine Opiates Screen  Negative  (NEGATIVE)   12/11/17  13:27    


 


Urine Methadone Screen  Negative  (NEGATIVE)   12/11/17  13:27    


 


Ur Barbiturates Screen  Negative  (NEGATIVE)   12/11/17  13:27    


 


Ur Phencyclidine Scrn  Negative  (NEGATIVE)   12/11/17  13:27    


 


Ur Amphetamines Screen  Negative  (NEGATIVE)   12/11/17  13:27    


 


U Benzodiazepines Scrn  Negative  (NEGATIVE)   12/11/17  13:27    


 


U Oth Cocaine Metabols  Negative  (NEGATIVE)   12/11/17  13:27    


 


U Cannabinoids Screen  Positive  (NEGATIVE)  H  12/11/17  13:27    


 


Alcohol, Quantitative  < 10 mg/dl (0-10)   12/11/17  02:46    














Attending/Attestation





- Attestation


I have personally seen and examined this patient.: Yes


I have fully participated in the care of the patient.: Yes


I have reviewed all pertinent clinical information, including history, physical 

exam and plan: Yes


Notes (Text): 





12/12/17 13:56


Medical attending: Patient was seen and examined by me, agree with the above 

note by medical resident.





The patient was not under any acute distress when we saw him. He explains that 

he does occasionally take marijuana, and we explained to him that considering 

that he has a history of seizures like this he should try to avoid that. 

Furthermore he also explains that he was drinking very heavily easily. We asked 

if he has a habit of drinking every single day he says that he does not we 

explained to him that sometimes if he has alcohol withdrawal at this could 

cause seizures well. The patient explains to us that he does not drink every 

day just happened to be a celebration that he was drinking heavily for.





We encouraged the patient to follow-up with his regular doctor, there is also a 

prescription for Keppra as by mouth twice a day





Thank you very much,


Jaren Gale

## 2018-02-06 ENCOUNTER — HOSPITAL ENCOUNTER (EMERGENCY)
Dept: HOSPITAL 42 - ED | Age: 23
LOS: 1 days | Discharge: HOME | End: 2018-02-07
Payer: COMMERCIAL

## 2018-02-06 VITALS — OXYGEN SATURATION: 98 %

## 2018-02-06 DIAGNOSIS — R56.9: Primary | ICD-10-CM

## 2018-02-06 DIAGNOSIS — F17.210: ICD-10-CM

## 2018-02-06 LAB
BASOPHILS # BLD AUTO: 0.01 K/MM3 (ref 0–2)
BASOPHILS NFR BLD: 0.1 % (ref 0–3)
EOSINOPHIL # BLD: 0.1 10*3/UL (ref 0–0.7)
EOSINOPHIL NFR BLD: 0.8 % (ref 1.5–5)
ERYTHROCYTE [DISTWIDTH] IN BLOOD BY AUTOMATED COUNT: 12.3 % (ref 11.5–14.5)
GRANULOCYTES # BLD: 9.57 10*3/UL (ref 1.4–6.5)
GRANULOCYTES NFR BLD: 84.4 % (ref 50–68)
HGB BLD-MCNC: 13.4 G/DL (ref 14–18)
LYMPHOCYTES # BLD: 1.1 10*3/UL (ref 1.2–3.4)
LYMPHOCYTES NFR BLD AUTO: 9.8 % (ref 22–35)
MCH RBC QN AUTO: 33.8 PG (ref 25–35)
MCHC RBC AUTO-ENTMCNC: 33 G/DL (ref 31–37)
MCV RBC AUTO: 102.5 FL (ref 80–105)
MONOCYTES # BLD AUTO: 0.6 10*3/UL (ref 0.1–0.6)
MONOCYTES NFR BLD: 4.9 % (ref 1–6)
PLATELET # BLD: 297 10^3/UL (ref 120–450)
PMV BLD AUTO: 10.1 FL (ref 7–11)
RBC # BLD AUTO: 3.96 10^6/UL (ref 3.5–6.1)
WBC # BLD AUTO: 11.3 10^3/UL (ref 4.5–11)

## 2018-02-06 PROCEDURE — 99285 EMERGENCY DEPT VISIT HI MDM: CPT

## 2018-02-06 PROCEDURE — 70450 CT HEAD/BRAIN W/O DYE: CPT

## 2018-02-06 PROCEDURE — 93005 ELECTROCARDIOGRAM TRACING: CPT

## 2018-02-06 PROCEDURE — 85610 PROTHROMBIN TIME: CPT

## 2018-02-06 PROCEDURE — 85025 COMPLETE CBC W/AUTO DIFF WBC: CPT

## 2018-02-06 PROCEDURE — 85730 THROMBOPLASTIN TIME PARTIAL: CPT

## 2018-02-06 PROCEDURE — 80053 COMPREHEN METABOLIC PANEL: CPT

## 2018-02-06 NOTE — ED PDOC
Arrival/HPI





- General


Chief Complaint: Altered Mental Status


Time Seen by Provider: 02/06/18 22:31


Historian: Patient





- History of Present Illness


Narrative History of Present Illness (Text): 


02/06/18 22:34


Brandon Norman is a 22 year old male, whose past medical history includes 

seizure disorder on Keppra, who presents to the Emergency department brought in 

by EMS status post seizure tonight. Patient states he was at home with his 

family when he had a seizure. Patient states he remembers losing consciousness 

but is unable to recall anything after the incident. Patient states he took 

Keppra yesterday . Patient states he may have bit his tongue but denies any 

urinary incontinence. Patient states it has "been awhile" since his last 

seizure. Patient states he currently has a headache. Patient denies any alcohol 

abuse or drug abuse. Patient denies any visual changes, focal neurological 

deficits, back pain, neck pain, dizziness, fever, chest pain, shortness of 

breath, nausea, vomiting, or any other complaints.


02/08/18 07:44





Time/Duration: Prior to Arrival


Symptom Onset: Sudden


Symptom Course: Unchanged


Activities at Onset: Light


Context: Home





Past Medical History





- Provider Review


Nursing Documentation Reviewed: Yes





- Neurological


Hx Neurological Disorder: Yes


Hx Seizures: Yes





- Psychiatric


Hx Substance Use: Yes (marijuana)





Family/Social History





- Physician Review


Nursing Documentation Reviewed: Yes


Family/Social History: Unknown Family HX


Smoking Status: Light Smoker < 10 Cigarettes Daily


Hx Alcohol Use: No


Hx Substance Use: Yes (marijuana)





Allergies/Home Meds


Allergies/Adverse Reactions: 


Allergies





EGG Allergy (Verified 02/07/18 13:37)


 SHORTNESS OF BREATH


 hives sob 


egg Allergy (Verified 02/07/18 13:37)


 ANAPHYLAXIS








Home Medications: 


 Home Meds











 Medication  Instructions  Recorded  Confirmed


 


Levetiracetam [Keppra] 750 mg PO BID 02/06/18 02/07/18














Review of Systems





- Physician Review


All systems were reviewed & negative as marked: Yes





- Review of Systems


Constitutional: Normal.  absent: Fevers


Eyes: Normal


ENT: Normal


Respiratory: Normal.  absent: SOB, Cough


Cardiovascular: Normal.  absent: Chest Pain


Gastrointestinal: Normal.  absent: Abdominal Pain, Diarrhea, Nausea, Vomiting


Genitourinary Male: Normal.  absent: Dysuria, Frequency


Musculoskeletal: Normal.  absent: Back Pain, Neck Pain


Skin: Normal.  absent: Rash


Neurological: Headache, Seizure


Endocrine: Normal


Hemo/Lymphatic: Normal


Psychiatric: Normal





Physical Exam


Vital Signs Reviewed: Yes


Vital Signs











  Temp Pulse Resp BP Pulse Ox


 


 02/07/18 02:09  98.1 F  72  17  135/72  98


 


 02/06/18 22:00  98.7 F  78  16  143/76  98











Temperature: Afebrile


Blood Pressure: Normal


Pulse: Regular


Respiratory Rate: Normal


Appearance: Positive for: Well-Appearing, Non-Toxic, Comfortable


Pain Distress: None


Mental Status: Positive for: Confused (Mildly confused responses).  No: Alert 

and Oriented X 3 (Alert, oriented x2)





- Systems Exam


Head: Present: Atraumatic, Normocephalic


Pupils: Present: PERRL


Extroacular Muscles: Present: EOMI


Conjunctiva: Present: Normal


Mouth: Present: Moist Mucous Membranes


Neck: Present: Normal Range of Motion


Respiratory/Chest: Present: Clear to Auscultation, Good Air Exchange.  No: 

Respiratory Distress, Accessory Muscle Use


Cardiovascular: Present: Regular Rate and Rhythm, Normal S1, S2.  No: Murmurs


Abdomen: Present: Normal Bowel Sounds.  No: Tenderness, Distention, Peritoneal 

Signs


Back: Present: Normal Inspection


Upper Extremity: Present: Normal Inspection.  No: Cyanosis, Edema


Lower Extremity: Present: Normal Inspection.  No: Edema


Neurological: Present: GCS=15, CN II-XII Intact, Motor Func Grossly Intact, 

Normal Sensory Function, Normal Cerebellar Funct.  No: Memory Normal (Mildly 

confused responses)


Skin: Present: Warm, Dry, Normal Color.  No: Rashes


Psychiatric: Present: Alert.  No: Oriented x 3 (Oriented x2)





Medical Decision Making


ED Course and Treatment: 


02/06/18 22:34


Impression:


22 year old male brought in s/p seizure. reports compliant with meds. 





Plan:


-- CT Head w/o contrast


-- EKG


-- Labs


-- Urinalysis


-- IV fluids


-- Reassess and disposition





Progress Notes:


02/07/18 02:00


CT Head shows:


Brain: The white-gray differentiation is preserved demonstrating no acute 

territorial type infarct. No


acute intracranial hemorrhage is seen.


Midline shift: There is no midline shift.


Ventricles: No ventriculomegaly.


Bones/joints: The calvarium demonstrates no evidence for a depressed fracture.


Soft tissues: No acute abnormality.


Sinuses: Unremarkable as visualized. No acute sinusitis.


Mastoid air cells: No mastoid effusion.


IMPRESSION:


1. No acute intracranial abnormality.





02/07/18 02:09


On re-evaluation, patient feels better and is in no acute distress oriented x 3 

steady gait, . I have discussed the results and plan with the patient, who 

expresses understanding. Patient in agreement with plan to be discharged home. 

Patient is stable for discharge. Patient was instructed to follow up with 

physician or return if symptoms worsen or new concerning symptoms arise.





02/08/18 07:44








- Lab Interpretations


Lab Results: 








 02/06/18 23:01 





 02/06/18 23:01 





 Lab Results





02/06/18 23:01: PT 12.6 H, INR 1.10 H, APTT 24.8 L


02/06/18 23:01: Sodium 143, Potassium 4.6, Chloride 101, Carbon Dioxide 30, 

Anion Gap 17, BUN 17, Creatinine 0.7 L, Est GFR ( Amer) > 60, Est GFR (

Non-Af Amer) > 60, Random Glucose 70, Calcium 11.1 H, Total Bilirubin 0.5, AST 

58, ALT 50, Alkaline Phosphatase 57, Total Protein 7.8, Albumin 4.4, Globulin 

3.4, Albumin/Globulin Ratio 1.3


02/06/18 23:01: WBC 11.3 H, RBC 3.96, Hgb 13.4 L, Hct 40.6 L, .5, MCH 

33.8, MCHC 33.0, RDW 12.3, Plt Count 297, MPV 10.1, Gran % 84.4 H, Lymph % (Auto

) 9.8 L, Mono % (Auto) 4.9, Eos % (Auto) 0.8 L, Baso % (Auto) 0.1, Gran # 9.57 H

, Lymph # (Auto) 1.1 L, Mono # (Auto) 0.6, Eos # (Auto) 0.1, Baso # (Auto) 0.01








I have reviewed the lab results: Yes





- RAD Interpretation


Radiology Orders: 








02/07/18 00:29


HEAD W/O CONTRAST [CT] Stat 











: Radiologist





- EKG Interpretation


Interpreted by ED Physician: Yes


Type: 12 lead EKG





- Medication Orders


Current Medication Orders: 











Discontinued Medications





Sodium Chloride (Sodium Chloride 0.9%)  500 mls @ 1,000 mls/hr IV .Q30M STA


   Stop: 02/06/18 23:02


   Last Admin: 02/06/18 23:10  Dose: 1,000 mls/hr





eMAR Start Stop


 Document     02/06/18 23:10  IT  (Rec: 02/06/18 23:10  IT  [id number])


     Intravenous Solution


      Start Date                                 02/06/18


      Start Time                                 23:10


      End Date                                   02/06/18


      End time                                   00:10


      Total Infusion Time                        -1380














- Scribe Statement


The provider has reviewed the documentation as recorded by the Florin Saini





Provider Scribe Attestation:


All medical record entries made by the Scribe were at my direction and 

personally dictated by me. I have reviewed the chart and agree that the record 

accurately reflects my personal performance of the history, physical exam, 

medical decision making, and the department course for this patient. I have 

also personally directed, reviewed, and agree with the discharge instructions 

and disposition.








Disposition/Present on Arrival





- Present on Arrival


Any Indicators Present on Arrival: No


History of DVT/PE: No


History of Uncontrolled Diabetes: No


Urinary Catheter: No


History of Decub. Ulcer: No


History Surgical Site Infection Following: None





- Disposition


Have Diagnosis and Disposition been Completed?: Yes


Diagnosis: 


 Seizure





Disposition: HOME/ ROUTINE


Disposition Time: 02:09


Patient Problems: 


 Current Active Problems











Problem Status Onset


 


Seizure Acute  











Condition: STABLE


Discharge Instructions (ExitCare):  Recurrent Seizures in Adults (ED)


Additional Instructions: 


return to er with worsening symptoms or concerns. 


Referrals: 


[facility], [Primary Care Provider] - Follow up with primary


Mark Falcon MD [Staff Provider] - Follow up with primary


Forms:  [employer] (English)

## 2018-02-07 ENCOUNTER — HOSPITAL ENCOUNTER (OUTPATIENT)
Dept: HOSPITAL 42 - ED | Age: 23
Setting detail: OBSERVATION
LOS: 1 days | Discharge: LEFT BEFORE DISCHARGE | End: 2018-02-08
Attending: INTERNAL MEDICINE | Admitting: INTERNAL MEDICINE
Payer: COMMERCIAL

## 2018-02-07 VITALS
DIASTOLIC BLOOD PRESSURE: 72 MMHG | SYSTOLIC BLOOD PRESSURE: 135 MMHG | HEART RATE: 72 BPM | TEMPERATURE: 98.1 F | RESPIRATION RATE: 17 BRPM

## 2018-02-07 VITALS — RESPIRATION RATE: 20 BRPM

## 2018-02-07 VITALS — BODY MASS INDEX: 27.3 KG/M2

## 2018-02-07 DIAGNOSIS — G40.409: Primary | ICD-10-CM

## 2018-02-07 DIAGNOSIS — F43.29: ICD-10-CM

## 2018-02-07 DIAGNOSIS — Z91.14: ICD-10-CM

## 2018-02-07 DIAGNOSIS — S01.81XA: ICD-10-CM
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
## 2018-02-07 DIAGNOSIS — F17.210: ICD-10-CM

## 2018-02-07 DIAGNOSIS — R45.6: ICD-10-CM

## 2018-02-07 LAB
ALBUMIN SERPL-MCNC: 4.4 G/DL (ref 3–4.8)
ALBUMIN SERPL-MCNC: 4.5 G/DL (ref 3–4.8)
ALBUMIN/GLOB SERPL: 1.2 {RATIO} (ref 1.1–1.8)
ALBUMIN/GLOB SERPL: 1.3 {RATIO} (ref 1.1–1.8)
ALT SERPL-CCNC: 44 U/L (ref 7–56)
ALT SERPL-CCNC: 50 U/L (ref 7–56)
APPEARANCE UR: (no result)
APTT BLD: 24.8 SECONDS (ref 25.1–36.5)
APTT BLD: 28.2 SECONDS (ref 25.1–36.5)
AST SERPL-CCNC: 58 U/L (ref 17–59)
AST SERPL-CCNC: 62 U/L (ref 17–59)
BACTERIA #/AREA URNS HPF: (no result) /[HPF]
BASOPHILS # BLD AUTO: 0.02 K/MM3 (ref 0–2)
BASOPHILS NFR BLD: 0.1 % (ref 0–3)
BILIRUB UR-MCNC: NEGATIVE MG/DL
BUN SERPL-MCNC: 16 MG/DL (ref 7–21)
BUN SERPL-MCNC: 17 MG/DL (ref 7–21)
CALCIUM SERPL-MCNC: 10.3 MG/DL (ref 8.4–10.5)
CALCIUM SERPL-MCNC: 11.1 MG/DL (ref 8.4–10.5)
COLOR UR: YELLOW
EOSINOPHIL # BLD: 0.1 10*3/UL (ref 0–0.7)
EOSINOPHIL NFR BLD: 0.3 % (ref 1.5–5)
ERYTHROCYTE [DISTWIDTH] IN BLOOD BY AUTOMATED COUNT: 12.4 % (ref 11.5–14.5)
GFR NON-AFRICAN AMERICAN: > 60
GFR NON-AFRICAN AMERICAN: > 60
GLUCOSE UR STRIP-MCNC: NEGATIVE MG/DL
GRANULOCYTES # BLD: 18.19 10*3/UL (ref 1.4–6.5)
GRANULOCYTES NFR BLD: 87.3 % (ref 50–68)
HGB BLD-MCNC: 14 G/DL (ref 14–18)
INR PPP: 1.1 (ref 0.93–1.08)
INR PPP: 1.12 (ref 0.93–1.08)
LEUKOCYTE ESTERASE UR-ACNC: NEGATIVE LEU/UL
LYMPHOCYTES # BLD: 1.3 10*3/UL (ref 1.2–3.4)
LYMPHOCYTES NFR BLD AUTO: 6.3 % (ref 22–35)
MCH RBC QN AUTO: 34.2 PG (ref 25–35)
MCHC RBC AUTO-ENTMCNC: 33.6 G/DL (ref 31–37)
MCV RBC AUTO: 102 FL (ref 80–105)
MONOCYTES # BLD AUTO: 1.3 10*3/UL (ref 0.1–0.6)
MONOCYTES NFR BLD: 6 % (ref 1–6)
PH UR STRIP: 7 [PH] (ref 4.7–8)
PLATELET # BLD: 296 10^3/UL (ref 120–450)
PMV BLD AUTO: 10 FL (ref 7–11)
PROT UR STRIP-MCNC: 30 MG/DL
PROTHROMBIN TIME: 12.6 SECONDS (ref 9.4–12.5)
PROTHROMBIN TIME: 12.9 SECONDS (ref 9.4–12.5)
RBC # BLD AUTO: 4.09 10^6/UL (ref 3.5–6.1)
RBC # UR STRIP: (no result) /UL
RBC #/AREA URNS HPF: (no result) /HPF (ref 0–2)
SP GR UR STRIP: 1.02 (ref 1–1.03)
URINE NITRATE: NEGATIVE
UROBILINOGEN UR STRIP-ACNC: 1 E.U./DL
WBC # BLD AUTO: 20.9 10^3/UL (ref 4.5–11)
WBC #/AREA URNS HPF: (no result) /HPF (ref 0–6)

## 2018-02-07 PROCEDURE — 36415 COLL VENOUS BLD VENIPUNCTURE: CPT

## 2018-02-07 PROCEDURE — 80346 BENZODIAZEPINES1-12: CPT

## 2018-02-07 PROCEDURE — 80299 QUANTITATIVE ASSAY DRUG: CPT

## 2018-02-07 PROCEDURE — 80324 DRUG SCREEN AMPHETAMINES 1/2: CPT

## 2018-02-07 PROCEDURE — 90715 TDAP VACCINE 7 YRS/> IM: CPT

## 2018-02-07 PROCEDURE — 70450 CT HEAD/BRAIN W/O DYE: CPT

## 2018-02-07 PROCEDURE — 80345 DRUG SCREENING BARBITURATES: CPT

## 2018-02-07 PROCEDURE — 70486 CT MAXILLOFACIAL W/O DYE: CPT

## 2018-02-07 PROCEDURE — 80353 DRUG SCREENING COCAINE: CPT

## 2018-02-07 PROCEDURE — 85610 PROTHROMBIN TIME: CPT

## 2018-02-07 PROCEDURE — 95812 EEG 41-60 MINUTES: CPT

## 2018-02-07 PROCEDURE — 83992 ASSAY FOR PHENCYCLIDINE: CPT

## 2018-02-07 PROCEDURE — 99285 EMERGENCY DEPT VISIT HI MDM: CPT

## 2018-02-07 PROCEDURE — 85025 COMPLETE CBC W/AUTO DIFF WBC: CPT

## 2018-02-07 PROCEDURE — 80358 DRUG SCREENING METHADONE: CPT

## 2018-02-07 PROCEDURE — 71045 X-RAY EXAM CHEST 1 VIEW: CPT

## 2018-02-07 PROCEDURE — 80361 OPIATES 1 OR MORE: CPT

## 2018-02-07 PROCEDURE — 80320 DRUG SCREEN QUANTALCOHOLS: CPT

## 2018-02-07 PROCEDURE — 90471 IMMUNIZATION ADMIN: CPT

## 2018-02-07 PROCEDURE — 85730 THROMBOPLASTIN TIME PARTIAL: CPT

## 2018-02-07 PROCEDURE — 81001 URINALYSIS AUTO W/SCOPE: CPT

## 2018-02-07 PROCEDURE — 80349 CANNABINOIDS NATURAL: CPT

## 2018-02-07 PROCEDURE — 80053 COMPREHEN METABOLIC PANEL: CPT

## 2018-02-07 NOTE — RAD
HISTORY:

fall  



COMPARISON:

No prior. 



FINDINGS:



LUNGS:

No active pulmonary disease.



PLEURA:

No significant pleural effusion identified, no pneumothorax apparent.



CARDIOVASCULAR:

Normal.



OSSEOUS STRUCTURES:

No significant abnormalities.



VISUALIZED UPPER ABDOMEN:

Normal.



OTHER FINDINGS:

None.



IMPRESSION:

No active disease.

## 2018-02-07 NOTE — CP.PCM.CON
History of Present Illness





- History of Present Illness


History of Present Illness: 





   22 yr old male with known epilepsy, who had several breakthrough seizures, 

was discharged home, and returned with a new seizure this morning. Patient had 

no sick contacts, no fevers, no headache, no other medical issues noted.  He 

has a history of epilepsy, syndrome as of yet unknown and is on keppra 500 mg 

bid. It is not clear if he is compliant with his medications. 








EEG: read by myself shows normal posterior dominant rhythm and two, less than 

one second, frontal dominant spike and wave discharge lasting less than 2 

seconds. No subclinical or clinical seizures. 





On exam: limited because the patient is extremely fatigued. 


AAOX2.Does not know the exact date. EOMI. Large, circumscribed and fluctuant 

hematoma on frontal lobe with laceration. no facial asymmety. CN 2-12 normal 

and intact. MOtor: strength normal 5/5 ul and ll bl. 


Sensory: not accurate at this time. 


Gait not tested. 














Past Patient History





- Infectious Disease


Hx of Infectious Diseases: None





- Past Medical History & Family History


Past Medical History?: Yes





- Past Social History


Smoking Status: Light Smoker < 10 Cigarettes Daily





- CARDIAC


Hx Cardiac Disorders: No


Hx Hypertension: No





- PULMONARY


Hx Respiratory Disorders: No


Hx Tuberculosis: No





- NEUROLOGICAL


Hx Neurological Disorder: Yes


Hx Seizures: Yes





- HEENT


Hx HEENT Problems: No





- RENAL


Hx Chronic Kidney Disease: No





- ENDOCRINE/METABOLIC


Hx Endocrine Disorders: No





- HEMATOLOGICAL/ONCOLOGICAL


Hx Blood Disorders: No


Hx Human Immunodeficiency Virus (HIV): No





- INTEGUMENTARY


Hx Dermatological Problems: No





- MUSCULOSKELETAL/RHEUMATOLOGICAL


Hx Musculoskeletal Disorders: No


Hx Falls: No





- GASTROINTESTINAL


Hx Gastrointestinal Disorders: No





- GENITOURINARY/GYNECOLOGICAL


Hx Genitourinary Disorders: No


Hx Sexually Transmitted Disorders: No





- PSYCHIATRIC


Hx Substance Use: Yes (marijuana)





- SURGICAL HISTORY


Hx Surgeries: Yes


Other/Comment: jaw surgiries.  chest tube





- ANESTHESIA


Hx Anesthesia: Yes


Hx Anesthesia Reactions: No


Hx Malignant Hyperthermia: No





Meds


Allergies/Adverse Reactions: 


 Allergies











Allergy/AdvReac Type Severity Reaction Status Date / Time


 


EGG Allergy  SHORTNESS Verified 02/07/18 13:37





   OF BREATH  


 


egg Allergy  ANAPHYLAXIS Verified 02/07/18 13:37














- Medications


Medications: 


 Current Medications





Famotidine (Pepcid)  20 mg PO BID JUAN


Levetiracetam 750 mg/ Sodium (Chloride)  107.5 mls @ 460 mls/hr IV Q12 JUAN


Lorazepam (Ativan)  2 mg IVP Q6H PRN; Protocol


   PRN Reason: Seizure activity











Results





- Vital Signs


Recent Vital Signs: 


 Last Vital Signs











Temp  98.7 F   02/07/18 13:38


 


Pulse  65   02/07/18 13:38


 


Resp  18   02/07/18 13:38


 


BP  117/52 L  02/07/18 13:38


 


Pulse Ox  100   02/07/18 10:40














- Labs


Result Diagrams: 


 02/07/18 05:46





 02/07/18 06:20





- Imaging and Cardiology


  ** CT scan - head


Additional comment: 





CT head: normal with no strokes, fractures or hemorrhages. 








Assessment & Plan





- Assessment and Plan (Free Text)


Assessment: 





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 

compliant with his medications.  I will continue keppra for today but feel that 

long term, he will benefit from transitioning to depakote. 





Plan: 


1. Seroquel for sleep tonight. 


2. Start depakote 500 mg bid tomorrow with initial load of 1000 mg IV depakote 

tonight. 


3. Decrease keppra to 500 mg am and 250 am pm tomorrow. 


4. Continue on depakote 750  mg bid tomorrow morning. 


5. On Friday, please decrease keppra to 250 mg bid


6. ON saturday, decrease keppra to 250 mg am 


7. DC keppra on sunday and continue on Depakote 750 mg bid. 


8. Followup with o/p Neurologist.

## 2018-02-07 NOTE — CP.PCM.HP
<Brittany Sanford - Last Filed: 02/07/18 13:13>





History of Present Illness





- History of Present Illness


History of Present Illness: 


PGY-2 H&P for hospitalist service 





22 year old male, whose past medical history includes seizures, who presents to 

the Emergency department status post fall secondary to seizure. Patient states 

he "passed out" at home and fell to the ground. He states that it was a seizure

, unwitnessed. He denies loss of bowel and bladder. Patient is somnolent in 

Emergency department, he sustained a laceration to the forehead which was 

repaired in ED. He denies any substance/alcohol abuse. He reports headache and 

denies recent illness, chest pain, abd pain, dysuria. He reports seizure 

yesterday and was seen in the ED at the time. He states that he is compliant 

with his keppra medication and his last dose was yesterday. Limited HPI and ROS 

secondary to patient's altered mental status.





PMH: seizure


PSH: denies


social history: social alcohol use, smokes about 5 cigarettes/day, denies 

illicit drug use


family history: denies


allergy: egg- swelling 


home medication keppra 750mg BID





Present on Admission





- Present on Admission


Any Indicators Present on Admission: No





Review of Systems





- Constitutional


Constitutional: absent: Chills, Fever, Lethargy





- EENT


Eyes: absent: Change in Vision


Nose/Mouth/Throat: absent: Nasal Congestion, Sore Throat





- Cardiovascular


Cardiovascular: absent: Chest Pain, Dyspnea, Palpitations





- Respiratory


Respiratory: absent: Cough, Dyspnea





- Genitourinary


Genitourinary: absent: Difficulty Urinating, Dysuria





- Musculoskeletal


Musculoskeletal: absent: Arthralgias, Numbness, Tingling





- Integumentary


Integumentary: absent: Pruritus, Rash


Additional comments: 





laceration on forehead





- Neurological


Neurological: Convulsions, Headaches.  absent: Dizziness, Numbness, Weakness


Additional comments: 





no loss of bowel and bladder





- Hematologic/Lymphatic


Hematologic: absent: Easy Bleeding, Easy Bruising





Past Patient History





- Infectious Disease


Hx of Infectious Diseases: None





- Past Medical History & Family History


Past Medical History?: Yes





- Past Social History


Smoking Status: Light Smoker < 10 Cigarettes Daily





- CARDIAC


Hx Cardiac Disorders: No


Hx Hypertension: No





- PULMONARY


Hx Respiratory Disorders: No


Hx Tuberculosis: No





- NEUROLOGICAL


Hx Neurological Disorder: Yes


Hx Seizures: Yes





- HEENT


Hx HEENT Problems: No





- RENAL


Hx Chronic Kidney Disease: No





- ENDOCRINE/METABOLIC


Hx Endocrine Disorders: No





- HEMATOLOGICAL/ONCOLOGICAL


Hx Blood Disorders: No


Hx Human Immunodeficiency Virus (HIV): No





- INTEGUMENTARY


Hx Dermatological Problems: No





- MUSCULOSKELETAL/RHEUMATOLOGICAL


Hx Musculoskeletal Disorders: No


Hx Falls: No





- GASTROINTESTINAL


Hx Gastrointestinal Disorders: No





- GENITOURINARY/GYNECOLOGICAL


Hx Genitourinary Disorders: No


Hx Sexually Transmitted Disorders: No





- PSYCHIATRIC


Hx Substance Use: Yes (marijuana)





- SURGICAL HISTORY


Hx Surgeries: Yes


Other/Comment: jaw surgiries.  chest tube





- ANESTHESIA


Hx Anesthesia: Yes


Hx Anesthesia Reactions: No


Hx Malignant Hyperthermia: No





Meds


Allergies/Adverse Reactions: 


 Allergies











Allergy/AdvReac Type Severity Reaction Status Date / Time


 


EGG Allergy  SHORTNESS Verified 02/07/18 13:37





   OF BREATH  


 


egg Allergy  ANAPHYLAXIS Verified 02/07/18 13:37














Physical Exam





- Constitutional


Appears: No Acute Distress





- Head Exam


Head Exam: NORMAL INSPECTION, NORMOCEPHALIC


Additional comments: 





laceration on forehead





- Eye Exam


Eye Exam: EOMI, Normal appearance





- ENT Exam


ENT Exam: Mucous Membranes Moist





- Respiratory Exam


Respiratory Exam: Clear to Auscultation Bilateral, NORMAL BREATHING PATTERN.  

absent: Rhonchi, Wheezes, Respiratory Distress





- Cardiovascular Exam


Cardiovascular Exam: REGULAR RHYTHM, +S1, +S2.  absent: Tachycardia, Systolic 

Murmur





- GI/Abdominal Exam


GI & Abdominal Exam: Normal Bowel Sounds, Soft.  absent: Distended, Firm





- Extremities Exam


Extremities exam: Positive for: normal inspection.  Negative for: pedal edema, 

tenderness





- Neurological Exam


Neurological exam: Alert, Oriented x3





- Skin


Additional comments: 





laceration of forehead, 





Results





- Vital Signs


Recent Vital Signs: 





 Last Vital Signs











Temp  98.7 F   02/07/18 10:40


 


Pulse  65   02/07/18 10:40


 


Resp  18   02/07/18 10:40


 


BP  117/52 L  02/07/18 10:40


 


Pulse Ox  100   02/07/18 10:40














- Labs


Result Diagrams: 


 02/07/18 05:46





 02/07/18 06:20





Assessment & Plan





- Assessment and Plan (Free Text)


Assessment: 





22 year old male, whose past medical history includes seizures, who presents to 

the Emergency department status post fall secondary to possible seizure.


Plan: 





seizure 


- In ED patient was given loading dose of keppra


- Keppra 750mg IVP


- will get keppra level


- ativan 2mg prn q6 prn for seizure activity 


- seizure precaution 


- fall precaution 


- neuro consult recommended EEG 





fall 


- patient received suture in ED


- CT head negative


- CT maxillofacial negative for fractures


- fall precaution 





leukocytosis 


- most likely reactive 


- will repat tomorrow AM





GI ppx- pepcid


dvt ppx- scds





<Nestor Hitchcockadrianne - Last Filed: 02/07/18 16:48>





Results





- Vital Signs


Recent Vital Signs: 





 Last Vital Signs











Temp  98.9 F   02/07/18 16:13


 


Pulse  63   02/07/18 16:13


 


Resp  20   02/07/18 16:13


 


BP  120/68   02/07/18 16:13


 


Pulse Ox  98   02/07/18 16:13














- Labs


Result Diagrams: 


 02/07/18 05:46





 02/07/18 06:20





Attending/Attestation





- Attestation


I have personally seen and examined this patient.: Yes


I have fully participated in the care of the patient.: Yes


I have reviewed all pertinent clinical information: Yes


Notes (Text): 





02/07/18 16:45


Patient was seen and examined with medical resident. 


Agreed with resident assessment and plan.


 22 yrs old male with PMH of Seizure disorder and drug abuse is admitted with 2 

episode of seizure and Fall at home.He is more awake in ER.


There is no focal deficit.Patient has been Keppra loading dose in ER.We will  

continue scheduled;ed Keppra and will get Neurology evaluation.


Management plan was discussed in detail with patient


Education was provided.

## 2018-02-07 NOTE — ED PDOC
Physical Exam


Vital Signs











  Temp Pulse Resp BP Pulse Ox


 


 02/07/18 07:40  98.4 F  68  100 H  145/67 


 


 02/07/18 04:41  98.9 F  65  20  141/36 L  97











Finger Stick Blood Glucose: 116





Medical Decision Making


ED Course and Treatment: 


02/07/18 07:16


Patient signed out to me by Dr. Patricia. Currently awaiting Head CT results.





02/07/2018 07:28


Head CT


IMPRESSION: No acute infarction, masses or hemorrhage is seen. No acute 

intracranial abnormality is identified. 


Dictator: Jj Law MD





02/07/18 09:04


Patient is AAOx3. No slurred speech. States he had a seizure. States he's 

unsure if he took his morning Keppra. Will load him with Keppra 1gm IV. 

Discussed case with Dr. Brito, Neurologist, who recommended an EEG and she will f/

u. I discussed case with Dr. Hitchcock who will see the patient. 








- Lab Interpretations


Lab Results: 








 02/07/18 05:46 





 02/07/18 06:20 





 Lab Results





02/07/18 07:15: Urine Color Yellow, Urine Appearance Turbid, Urine pH 7.0, Ur 

Specific Gravity 1.025, Urine Protein 30 H, Urine Glucose (UA) Negative, Urine 

Ketones Trace H, Urine Blood Moderate H, Urine Nitrate Negative, Urine 

Bilirubin Negative, Urine Urobilinogen 1.0 H, Ur Leukocyte Esterase Negative, 

Urine RBC 15 - 20, Urine WBC 0 - 2, Urine Bacteria Trace, Urine Other Mucus


02/07/18 07:15: Urine Opiates Screen Negative, Urine Methadone Screen Negative, 

Ur Barbiturates Screen Negative, Ur Phencyclidine Scrn Negative, Ur 

Amphetamines Screen Negative, U Benzodiazepines Scrn Positive, U Oth Cocaine 

Metabols Negative, U Cannabinoids Screen Positive H


02/07/18 07:00: PT 12.9 H, INR 1.12 H, APTT 28.2


02/07/18 06:20: Sodium 142, Potassium 4.2, Chloride 103, Carbon Dioxide 27, 

Anion Gap 16, BUN 16, Creatinine 0.8, Est GFR (African Amer) > 60, Est GFR (Non-

Af Amer) > 60, Random Glucose 111 H, Calcium 10.3, Total Bilirubin 0.7, AST 62 H

, ALT 44, Alkaline Phosphatase 69, Total Protein 8.2, Albumin 4.5, Globulin 3.7

, Albumin/Globulin Ratio 1.2


02/07/18 05:46: Alcohol, Quantitative < 10


02/07/18 05:46: WBC 20.9 H D, RBC 4.09, Hgb 14.0, Hct 41.7 L, .0, MCH 

34.2, MCHC 33.6, RDW 12.4, Plt Count 296, MPV 10.0, Gran % 87.3 H, Lymph % (Auto

) 6.3 L, Mono % (Auto) 6.0, Eos % (Auto) 0.3 L, Baso % (Auto) 0.1, Gran # 18.19 

H, Lymph # (Auto) 1.3, Mono # (Auto) 1.3 H, Eos # (Auto) 0.1, Baso # (Auto) 0.02











- RAD Interpretation


Radiology Orders: 








02/07/18 04:38


HEAD W/O CONTRAST [CT] Stat 





02/07/18 05:39


MAXILLOFACIAL W/O CONTRAST [CT] Stat 





02/07/18 06:11


CXR [CHEST PORTABLE] [RAD] Stat 














- Medication Orders


Current Medication Orders: 











Discontinued Medications





Levetiracetam 1,000 mg/ Sodium (Chloride)  110 mls @ 440 mls/hr IV ONCE ONE


   Stop: 02/07/18 09:05


Lidocaine/Epinephrine (Lidocaine 1%/Epinephrine 1:[id number] 30 Ml)  30 ml IJ ONCE 

ONE


   Stop: 02/07/18 04:41


   Last Admin: 02/07/18 07:01  Dose: 30 ml





Tetanus/Reduced Diphtheria/Acell Pertussis (Boostrix Vaccine Inj)  0.5 ml IM 

.ONCE ONE


   Stop: 02/07/18 04:40


   Last Admin: 02/07/18 06:57  Dose: 0.5 ml





Immunization Registry


 Document     02/07/18 06:57  SS  (Rec: 02/07/18 07:01  SS  [id number])


      Immunization Registry Consent Date         02/06/18














- Scribe Statement


The provider has reviewed the documentation as recorded by the Virgilibvaleria Lee





Provider Scribe Attestation:


All medical record entries made by the Scribe were at my direction and 

personally dictated by me. I have reviewed the chart and agree that the record 

accurately reflects my personal performance of the history, physical exam, 

medical decision making, and the department course for this patient. I have 

also personally directed, reviewed, and agree with the discharge instructions 

and disposition.








Disposition/Present on Arrival





- Present on Arrival


Any Indicators Present on Arrival: No


History of DVT/PE: No


History of Uncontrolled Diabetes: No


Urinary Catheter: No


History of Decub. Ulcer: No


History Surgical Site Infection Following: None





- Disposition


Have Diagnosis and Disposition been Completed?: Yes


Diagnosis: 


 Seizure





Disposition: HOSPITALIZED


Disposition Time: 09:07


Patient Plan: Observation


Condition: FAIR


Forms:  [facility] (English)

## 2018-02-07 NOTE — CARD
--------------- APPROVED REPORT --------------





EKG Measurement

Heart [id number]

KS [id number]

[id number]

[id number]

[id number]



<Conclusion>

Normal sinus rhythm with sinus arrhythmia

Normal ECG

## 2018-02-07 NOTE — CT
EXAM:

  CT Head Without Intravenous  Contrast



CLINICAL HISTORY:

  22 years old, male; Injury or trauma; Fall; Initial encounter; Concussion / 

head injury



TECHNIQUE:

  Axial computed tomography images of the head/brain without intravenous 

contrast.  All CT scans at this facility use one or more dose reduction 

techniques, viz.: automated exposure control; ma/kV adjustment per patient size 

(including targeted exams where dose is matched to indication; i.e. head); or 

iterative reconstruction technique.

  Coronal and sagittal reformatted images were created and reviewed.



COMPARISON:

  CT - HEAD W/O CONTRAST 2018-02-07 01:28



FINDINGS:

  Brain:  Examination of the brain demonstrates normal structure and 

attenuation.The cortical grey / white matter interfaces are preserved 

throughout the brain.No acute infarction, masses or hemorrhage is seen.  No 

acute intracranial abnormality is identified.There is no hyperdense MCA 

sign.Examination of the posterior fossa demonstrates no significant abnormality.

  Ventricles:  The ventricular system is not dilated and is appropriate for the 

patient's age.

  Bones/joints:  Unremarkable.  No acute fracture.

  Soft tissues:  Scalp hematoma is seen in the left frontal region.

  Sinuses:  Unremarkable as visualized.  No acute sinusitis.

  Mastoid air cells:  Unremarkable as visualized.  No mastoid effusion.



IMPRESSION:     

  No acute infarction, masses or hemorrhage is seen.  No acute intracranial 

abnormality is identified.

## 2018-02-07 NOTE — ED PDOC
Arrival/HPI





- General


Chief Complaint: Abnormal Skin Integrity


Time Seen by Provider: 02/07/18 04:37


Historian: Patient





- History of Present Illness


Narrative History of Present Illness (Text): 


02/07/18 04:39


Brandon Norman is a 22 year old male, whose past medical history includes 

seizures, who presents to the Emergency department status post fall prior to 

arrival. Patient states he "passed out" and fell to the ground. Patient 

sustained a laceration to the forehead. Patient is somnolent on arrival to 

Emergency department, denies any substance/alcohol abuse. Limited HPI and ROS 

secondary to patient's altered mental status.


Time/Duration: Prior to Arrival, Other (tonight)


Symptom Onset: Sudden


Symptom Course: Unchanged


Context: Walking, Street





Past Medical History





- Provider Review


Nursing Documentation Reviewed: Yes





- Neurological


Hx Neurological Disorder: Yes


Hx Seizures: Yes





- Psychiatric


Hx Substance Use: Yes (marijuana)





Family/Social History





- Physician Review


Nursing Documentation Reviewed: Yes


Family/Social History: Unknown Family HX


Smoking Status: Light Smoker < 10 Cigarettes Daily


Hx Alcohol Use: No


Hx Substance Use: Yes (marijuana)





Allergies/Home Meds


Allergies/Adverse Reactions: 


Allergies





EGG Allergy (Verified 02/07/18 13:37)


 SHORTNESS OF BREATH


 hives sob 


egg Allergy (Verified 02/07/18 13:37)


 ANAPHYLAXIS








Home Medications: 


 Home Meds











 Medication  Instructions  Recorded  Confirmed


 


Levetiracetam [Keppra] 750 mg PO BID 02/06/18 02/07/18














Review of Systems





- Review of Systems


Systems not reviewed;Unavailable: Altered Mental Status


Cardiovascular: Syncope


Skin: Laceration (+forehead laceration)





Physical Exam


Vital Signs Reviewed: Yes


Vital Signs











  Temp Pulse Resp BP Pulse Ox


 


 02/07/18 10:40  98.7 F  65  18  117/52 L  100


 


 02/07/18 07:40  98.4 F  68  100 H  145/67 


 


 02/07/18 04:41  98.9 F  65  20  141/36 L  97











Temperature: Afebrile


Blood Pressure: Normal


Pulse: Regular


Respiratory Rate: Normal


Appearance: Positive for: Well-Appearing, Non-Toxic, Comfortable


Pain Distress: None


Mental Status: Positive for: other (Somnolent)





- Systems Exam


Head: Present: Normocephalic, Laceration (4cm forehead laceration, laceration 

anterior left ear)


Pupils: Present: PERRL


Extroacular Muscles: Present: EOMI


Conjunctiva: Present: Normal


Mouth: Present: Moist Mucous Membranes


Neck: Present: Normal Range of Motion.  No: Meningeal Signs, MIDLINE TENDERNESS

, Paraspinal Tenderness


Respiratory/Chest: Present: Clear to Auscultation, Good Air Exchange.  No: 

Respiratory Distress, Accessory Muscle Use


Cardiovascular: Present: Regular Rate and Rhythm, Normal S1, S2.  No: Murmurs


Abdomen: Present: Normal Bowel Sounds.  No: Tenderness, Distention, Peritoneal 

Signs


Back: Present: Normal Inspection.  No: CVA Tenderness, Midline Tenderness, 

Paraspinal Tenderness


Upper Extremity: Present: Normal Inspection.  No: Cyanosis, Edema


Lower Extremity: Present: Normal Inspection.  No: Edema


Neurological: Present: GCS=15, CN II-XII Intact


Skin: Present: Warm, Dry, Normal Color.  No: Rashes


Psychiatric: Present: Other (Somnolent).  No: Alert





Medical Decision Making


ED Course and Treatment: 


02/07/18 04:39


Impression:


22 year old male presents s/p fall/syncopal episode with a forehead laceration. 

consider repeat seizure vs syncope vs tox





Plan:


-- CT Head w/o contrast


-- Labs, alcohol level


-- Lidocaine


-- Tetanus vaccine


-- Laceration repair


-- Reassess and disposition





Prior Visits:


Notes and results from previous visits were reviewed. Patient was seen in the 

Emergency department earlier tonight s/p seizure and discharged home. CT Head 

performed was negative for any acute findings.





Progress Notes:


02/07/18 05:13


PROCEDURE: LACERATION REPAIR


Performed by the emergency provider


Location: Forehead, anterior to left ear


Length: 4cm cm


Description: clean wound edges, no foreign bodies, V-shaped


Distal CMS: Normal. No deficits. Neurovascularly intact.


Anesthesia: Lidocaine 1%


Preparation: The wound was cleaned with NS and Betadyne. The area was prepped 

and draped in


the usual sterile fashion.


Exploration: The wound was explored and no foreign bodies were found.


Procedure: The wound was closed with 4-0 Nylon nylon. There was good 

approximation. In total, 10 stitches were used on forehead laceratio, 1 stitch 

was used for laceration anterior left ear.


Post-Procedure: Good closure and hemostasis. The patient tolerated the 

procedure well and there


were no complications. CSM remains intact. Post procedure dressing applied.





02/07/18 06:58





02/08/18 08:06


endorsed to day shift, pending head ct, reassess, anticipate admission





- Lab Interpretations


Lab Results: 








 02/07/18 05:46 





 02/07/18 06:20 





 Lab Results





02/07/18 07:15: Urine Color Yellow, Urine Appearance Turbid, Urine pH 7.0, Ur 

Specific Gravity 1.025, Urine Protein 30 H, Urine Glucose (UA) Negative, Urine 

Ketones Trace H, Urine Blood Moderate H, Urine Nitrate Negative, Urine 

Bilirubin Negative, Urine Urobilinogen 1.0 H, Ur Leukocyte Esterase Negative, 

Urine RBC 15 - 20, Urine WBC 0 - 2, Urine Bacteria Trace, Urine Other Mucus


02/07/18 07:15: Urine Opiates Screen Negative, Urine Methadone Screen Negative, 

Ur Barbiturates Screen Negative, Ur Phencyclidine Scrn Negative, Ur 

Amphetamines Screen Negative, U Benzodiazepines Scrn Positive, U Oth Cocaine 

Metabols Negative, U Cannabinoids Screen Positive H


02/07/18 07:00: PT 12.9 H, INR 1.12 H, APTT 28.2


02/07/18 06:20: Sodium 142, Potassium 4.2, Chloride 103, Carbon Dioxide 27, 

Anion Gap 16, BUN 16, Creatinine 0.8, Est GFR (African Amer) > 60, Est GFR (Non-

Af Amer) > 60, Random Glucose 111 H, Calcium 10.3, Total Bilirubin 0.7, AST 62 H

, ALT 44, Alkaline Phosphatase 69, Total Protein 8.2, Albumin 4.5, Globulin 3.7

, Albumin/Globulin Ratio 1.2


02/07/18 05:46: Alcohol, Quantitative < 10


02/07/18 05:46: WBC 20.9 H D, RBC 4.09, Hgb 14.0, Hct 41.7 L, .0, MCH 

34.2, MCHC 33.6, RDW 12.4, Plt Count 296, MPV 10.0, Gran % 87.3 H, Lymph % (Auto

) 6.3 L, Mono % (Auto) 6.0, Eos % (Auto) 0.3 L, Baso % (Auto) 0.1, Gran # 18.19 

H, Lymph # (Auto) 1.3, Mono # (Auto) 1.3 H, Eos # (Auto) 0.1, Baso # (Auto) 0.02











- RAD Interpretation


Radiology Orders: 








02/07/18 04:38


HEAD W/O CONTRAST [CT] Stat 





02/07/18 05:39


MAXILLOFACIAL W/O CONTRAST [CT] Stat 





02/07/18 06:11


CXR [CHEST PORTABLE] [RAD] Stat 














- Medication Orders


Current Medication Orders: 








Famotidine (Pepcid)  20 mg PO BID JUAN


   Last Admin: 02/07/18 18:04  Dose: 20 mg





Levetiracetam 750 mg/ Sodium (Chloride)  107.5 mls @ 460 mls/hr IV Q12 JUAN


   Last Admin: 02/07/18 21:43  Dose: 460 mls/hr





eMAR Start Stop


 Document     02/07/18 21:43  PCO  (Rec: 02/07/18 21:43  [place]  [facility] [id number]


     Intravenous Solution


      Start Date                                 02/07/18


      Start Time                                 21:43


      End Date                                   02/07/18


      End time                                   22:45


      Total Infusion Time                        62





Lorazepam (Ativan)  2 mg IVP Q6H PRN; Protocol


   PRN Reason: Seizure activity


Quetiapine Fumarate (Seroquel)  25 mg PO HS JUAN


   PRN Reason: Protocol


   Last Admin: 02/07/18 21:44  Dose: 25 mg





Behavioural


 Document     02/07/18 21:44  PCO  (Rec: 02/07/18 21:44  PCO  [facility] [id number])


     Maintenance


      Maintenance Dose                           Yes


     Behavior


      Behavior for Medication:                   Anxiety








Discontinued Medications





Acetaminophen (Tylenol 325mg Tab)  650 mg PO STAT STA


   Stop: 02/07/18 22:51


   Last Admin: 02/07/18 23:03  Dose: 650 mg





MAR Pain/Vitals


 Document     02/07/18 23:03  PCO  (Rec: 02/07/18 23:03  PCO  [facility] [id number])


     Pain Reassessment


      Is This A Pain ReAssessment?               No


     Sleep


      Is patient sleeping during reassessment?   No


     Presence of Pain


      Presence of Pain                           Yes


     Pain Scale Used


      Pain Scale Used                            Numeric


     Location


      Pain Location Body Site                    Face


      Description                                Intermittent


      Intensity                                  7


      Scale Used                                 Numeric


      Pain Behavior                              Facial Grimacing





Levetiracetam 1,000 mg/ Sodium (Chloride)  110 mls @ 440 mls/hr IV ONCE ONE


   Stop: 02/07/18 09:05


   Last Admin: 02/07/18 09:21  Dose: 440 mls/hr





eMAR Start Stop


 Document     02/07/18 09:21  LA  (Rec: 02/07/18 09:21  LA  [facility] [id number])


     Intravenous Solution


      Start Date                                 02/07/18


      Start Time                                 09:21





Lidocaine/Epinephrine (Lidocaine 1%/Epinephrine 1:394068 30 Ml)  30 ml IJ ONCE 

ONE


   Stop: 02/07/18 04:41


   Last Admin: 02/07/18 07:01  Dose: 30 ml





Lorazepam (Ativan)  2 mg IVP Q6H PRN; Protocol


   PRN Reason: Anxiety


Tetanus/Reduced Diphtheria/Acell Pertussis (Boostrix Vaccine Inj)  0.5 ml IM 

.ONCE ONE


   Stop: 02/07/18 04:40


   Last Admin: 02/07/18 06:57  Dose: 0.5 ml





Immunization Registry


 Document     02/07/18 06:57  SS  (Rec: 02/07/18 07:01  SS  [id number])


      Immunization Registry Consent Date         02/06/18














- Scribe Statement


The provider has reviewed the documentation as recorded by the Florin Saini





Provider Scribe Attestation:


All medical record entries made by the Scribvaleria were at my direction and 

personally dictated by me. I have reviewed the chart and agree that the record 

accurately reflects my personal performance of the history, physical exam, 

medical decision making, and the department course for this patient. I have 

also personally directed, reviewed, and agree with the discharge instructions 

and disposition.








Disposition/Present on Arrival





- Present on Arrival


Any Indicators Present on Arrival: No


History of DVT/PE: No


History of Uncontrolled Diabetes: No


Urinary Catheter: No


History of Decub. Ulcer: No


History Surgical Site Infection Following: None





- Disposition


Have Diagnosis and Disposition been Completed?: Yes


Diagnosis: 


 Seizure





Disposition: HOSPITALIZED


Disposition Time: 07:00


Patient Problems: 


 Current Active Problems











Problem Status Onset


 


Seizure Acute  











Condition: FAIR

## 2018-02-07 NOTE — CT
EXAM:

  CT Head Without Intravenous Contrast



EXAM DATE/TIME:

  2/7/2018 12:29 AM



CLINICAL HISTORY:

  The patient age is 22 years old and is male; Signs and symptoms; Syncope and 

collapse; Additional info: Seizure Facility exam id and description: Ct heads 

head w/o contrast



TECHNIQUE:

  Axial computed tomography images of the head/brain without intravenous 

contrast.  All CT scans at this facility use one or more dose reduction 

techniques, viz.: automated exposure control; ma/kV adjustment per patient size 

(including targeted exams where dose is matched to indication; i.e. head); or 

iterative reconstruction technique.

  Coronal and sagittal reformatted images were created and reviewed.



COMPARISON:

  No relevant prior studies available.



FINDINGS:

  Brain:  The white-gray differentiation is preserved demonstrating no acute 

territorial type infarct.  No acute intracranial hemorrhage is seen.

  Midline shift:  There is no midline shift.

  Ventricles:  No ventriculomegaly.

  Bones/joints:  The calvarium demonstrates no evidence for a depressed 

fracture.

  Soft tissues:  No acute abnormality.

  Sinuses:  Unremarkable as visualized.  No acute sinusitis.

  Mastoid air cells:  No mastoid effusion.



IMPRESSION:     

1.  No acute intracranial abnormality.

## 2018-02-08 VITALS
DIASTOLIC BLOOD PRESSURE: 55 MMHG | TEMPERATURE: 98.6 F | OXYGEN SATURATION: 95 % | HEART RATE: 55 BPM | SYSTOLIC BLOOD PRESSURE: 117 MMHG

## 2018-02-08 LAB
ALBUMIN SERPL-MCNC: 4 G/DL (ref 3–4.8)
ALBUMIN/GLOB SERPL: 1.3 {RATIO} (ref 1.1–1.8)
ALT SERPL-CCNC: 38 U/L (ref 7–56)
AST SERPL-CCNC: 43 U/L (ref 17–59)
BASOPHILS # BLD AUTO: 0.02 K/MM3 (ref 0–2)
BASOPHILS NFR BLD: 0.2 % (ref 0–3)
BUN SERPL-MCNC: 14 MG/DL (ref 7–21)
CALCIUM SERPL-MCNC: 9.9 MG/DL (ref 8.4–10.5)
EOSINOPHIL # BLD: 0.2 10*3/UL (ref 0–0.7)
EOSINOPHIL NFR BLD: 1.8 % (ref 1.5–5)
ERYTHROCYTE [DISTWIDTH] IN BLOOD BY AUTOMATED COUNT: 12.4 % (ref 11.5–14.5)
GFR NON-AFRICAN AMERICAN: > 60
GRANULOCYTES # BLD: 7.34 10*3/UL (ref 1.4–6.5)
GRANULOCYTES NFR BLD: 66.7 % (ref 50–68)
HGB BLD-MCNC: 12.2 G/DL (ref 14–18)
LYMPHOCYTES # BLD: 2.6 10*3/UL (ref 1.2–3.4)
LYMPHOCYTES NFR BLD AUTO: 23.4 % (ref 22–35)
MCH RBC QN AUTO: 33 PG (ref 25–35)
MCHC RBC AUTO-ENTMCNC: 32.4 G/DL (ref 31–37)
MCV RBC AUTO: 101.6 FL (ref 80–105)
MONOCYTES # BLD AUTO: 0.9 10*3/UL (ref 0.1–0.6)
MONOCYTES NFR BLD: 7.9 % (ref 1–6)
PLATELET # BLD: 282 10^3/UL (ref 120–450)
PMV BLD AUTO: 9.7 FL (ref 7–11)
RBC # BLD AUTO: 3.7 10^6/UL (ref 3.5–6.1)
WBC # BLD AUTO: 11 10^3/UL (ref 4.5–11)
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
## 2018-02-08 NOTE — EEG
DATE:



TECHNICAL INFORMATION:  Electrodes were placed according to the 10-20

International electrode system by EEG technologist.  Total of 23 electrodes

(21 EEG and 2 EKG) were placed.



EEG activity was digitally recorded referentially to P1/P2 or A1/A2

electrodes.



Continuous monitoring with EEG was performed using digital analysis for

spike detection.  The [employer] spike and seizure detection algorithms were

used for digital EEG analysis throughout the monitoring period to screen

the EEG in real-time and ifrah the data file with pointers to electrographic

seizures and interictal discharges.  EEG was screened for electrographic

seizures and interictal discharges by a technologist.  Physician,

epileptologist reviewed detections as well as extensive random samples and

whole EEG study in detail.



DIGITAL EEG ANALYSIS:  Was carried out including FFT (Fast Fourier

Transform), R2D2 (Rhythmicity Run Detection and Display), Relative

Asymmetry Spectrogram, and voltage plot by the [employer] Software.  The

Qualitative EEG analysis and the voltage plot mapping were used for

detection of foci of paroxysmal and abnormal electrical cortical activity.



GENERAL DESCRIPTION:

Background Rhythm:  There is a well-formed, 8-10 Hz posterior dominant

rhythm that is reactive, symmetric, and attenuates with eye opening.  There

was a normal amount of frontal beta noted bilaterally.  There is no sleep

recorded.

Normal sleep patterns were captured, including bilaterally symmetric 12-14

Hz spindles, vertex waves, and K complexes.



ACTIVATION PROCEDURES:

Photic stimulation:  There is no driving noted.

Hyperventilation:  There is slowing noted that is self-remitted.



ABNORMAL ACTIVITY:  There are intermittent fragments of primary generalized, 
frontal dominant spike and wave discharges, two discharges throughout the 
record.  There were no clinical or

subclinical seizures.



IMPRESSION:  This is an abnormal awake and sleep electroencephalogram.  The

presence of fragments of frontal dominant spike and wave discharges

indicate this patient has an underlying diagnosis of partially treated

primary generalized epilepsy.





















Clinical correlation is required.











__________________________________________

Valerie Brito MD

DD:  02/07/2018 15:57:15

DT:  02/07/2018 22:41:36

Job # [id number]









YASMEEN

## 2018-02-09 NOTE — CON
DATE:  02/08/2018



PRESENTATION:  Patient was admitted to the hospital on 02/07/2018 for

post-fall.  He indicates he passed out and fell to the ground.  He

sustained a laceration to the forehead and swelling.  Consultation occurred

due to the fact that a Code Gray was called, and I assessed the patient

when I responded.  Patient was quite agitated, yelling, upset because his

roommate evidently was quite noisy, and he said he wanted to kill him

unless he gets another room or he wanted to sign out AMA.  In talking with

the patient and looking through his history, there is no indication that he

has had any psych history.  He denies being suicidal or homicidal.  Denies

any hallucinations and does not appear to be responding to any internal

stimuli.  He calms down readily in conversation.  He indicates he lives

with his grandmother, that they take care of each other; he does not work,

she does.  When I questioned him as to what he does, he indicates that he

does smoke pot here and there, but no other drugs.  He has a history of

epilepsy.  Indicates he came to the emergency room after he had been in the

emergency room once for having a seizure, I guess, and was discharged and

then he went to [place] [employer] and fell out again, possibly with another

seizure, and came back with this laceration and swelling to his head. 

Medical team indicates that he is cleared from there and they had done a

CAT scan of his head and face.  Patient was given to understand that a bed

was not going to be available for him that was not in the room with the man

next to him.  So, he decided that he would sign out AMA.



PHYSICAL EXAMINATION:

VITAL SIGNS:  Currently, temperature of 98.6, pulse rate of 55, blood

pressure 117/55, respiratory rate of 20, O2 saturation of 95.



As stated earlier, his U-tox was positive for presence of marijuana, which

would follow exactly what the patient shared with me.



MENTAL STATUS EXAMINATION:  Patient is alert and oriented x3.  His eye

contact is good.  His behavior became cooperative in one-to-one

conversation.  Mood is euthymic.  Affect is full.  Thoughts are goal

directed, but concrete.  He denies being suicidal or homicidal.  Denies the

presence of hallucination, delusions, or paranoia.  His concentration and

focus are adequate.  His memory, both short and long term, appears

adequate.  His appetite and his sleep, he reports, are good.



DIAGNOSTIC IMPRESSION:  Adjustment disorder with behavioral disturbance.



PLAN:  Patient denies being suicidal or homicidal and appears in no

imminent danger of hurting himself or others.  He signed AMA from medical

service.  He was encouraged to take care of his wound and to take his

seizure medications regularly.



__________________________________________

GIANFRANCO Ramon





_____________________________________________

Kimmie Townsend MD.





DD:  02/08/2018 11:46:59

DT:  02/08/2018 22:15:59

Job # [id number]

## 2018-02-10 LAB — LEVETIRACETAM SERPL-MCNC: <1 MCG/ML

## 2018-02-12 ENCOUNTER — HOSPITAL ENCOUNTER (EMERGENCY)
Dept: HOSPITAL 42 - ED | Age: 23
Discharge: HOME | End: 2018-02-12
Payer: COMMERCIAL

## 2018-02-12 VITALS
RESPIRATION RATE: 18 BRPM | DIASTOLIC BLOOD PRESSURE: 74 MMHG | TEMPERATURE: 97.6 F | OXYGEN SATURATION: 98 % | HEART RATE: 78 BPM | SYSTOLIC BLOOD PRESSURE: 138 MMHG

## 2018-02-12 VITALS — BODY MASS INDEX: 22.1 KG/M2

## 2018-02-12 DIAGNOSIS — Z48.02: Primary | ICD-10-CM

## 2018-02-12 NOTE — ED PDOC
Arrival/HPI





- General


Chief Complaint: Suture/Staple Removal


Time Seen by Provider: 02/12/18 15:08


Historian: Patient





- History of Present Illness


Narrative History of Present Illness (Text): 





02/12/18 15:25


22-year-old male presents for suture removal. Patient states he fell 

approximately one week ago.  Patient denies headache dizziness or weakness. No 

chest pain or shortness of breath. Patient denies dizziness or weakness. No 

other complaints. patient states he has been applying cocoa butter to the 

laceration.





Past Medical History





- Provider Review


Nursing Documentation Reviewed: Yes





- Travel History


Have you recently traveled outside [place] w/in the past 3 mons?: No





- Infectious Disease


Hx of Infectious Diseases: None





- Tetanus Immunization


Tetanus Immunization: Up to Date





- Cardiac


Hx Cardiac Disorders: No


Hx Hypertension: No





- Pulmonary


Hx Respiratory Disorders: No


Hx Tuberculosis: No





- Neurological


Hx Neurological Disorder: Yes


Hx Seizures: Yes





- HEENT


Hx HEENT Disorder: No





- Renal


Hx Renal Disorder: No





- Endocrine/Metabolic


Hx Endocrine Disorders: No





- Hematological/Oncological


Hx Blood Disorders: No





- Integumentary


Hx Dermatological Disorder: No





- Musculoskeletal/Rheumatological


Hx Musculoskeletal Disorders: No


Hx Falls: No





- Gastrointestinal


Hx Gastrointestinal Disorders: No





- Genitourinary/Gynecological


Hx Genitourinary Disorders: No


Hx Sexually Transmitted Diseases: No





- Psychiatric


Hx Psychophysiologic Disorder: Yes (violent behavior,smokes cigarettes)


Hx Substance Use: Yes (marrawastevea)





- Surgical History


Other/Comment: jaw surgiries.  chest tube





- Anesthesia


Hx Anesthesia: Yes


Hx Anesthesia Reactions: No


Hx Malignant Hyperthermia: No





Family/Social History





- Physician Review


Nursing Documentation Reviewed: Yes


Family/Social History: Unknown Family HX


Smoking Status: Heavy Smoker > 10 Cigarettes Daily


Hx Alcohol Use: No


Hx Substance Use: Yes (marrawanna)





Allergies/Home Meds


Allergies/Adverse Reactions: 


Allergies





EGG Allergy (Verified 02/07/18 13:37)


 SHORTNESS OF BREATH


 hives sob 


egg Allergy (Verified 02/07/18 13:37)


 ANAPHYLAXIS








Home Medications: 


 Home Meds











 Medication  Instructions  Recorded  Confirmed


 


Levetiracetam [Keppra] 750 mg PO BID 02/06/18 02/07/18














Review of Systems





- Review of Systems


Constitutional: absent: Fatigue, Fevers


Eyes: absent: Eye Pain


Respiratory: absent: Cough


Cardiovascular: absent: Chest Pain, Palpitations


Gastrointestinal: absent: Abdominal Pain, Nausea, Vomiting


Genitourinary Male: absent: Dysuria, Frequency


Musculoskeletal: absent: Arthralgias


Skin: Laceration


Neurological: absent: Headache, Dizziness


Psychiatric: absent: Anxiety, Depression





Physical Exam


Vital Signs Reviewed: Yes


Temperature: Afebrile


Blood Pressure: Normal


Pulse: Regular


Respiratory Rate: Normal


Appearance: Positive for: Well-Appearing, Non-Toxic, Comfortable


Pain Distress: None


Mental Status: Positive for: Alert and Oriented X 3





- Systems Exam


Head: Present: Laceration (healing laceration to forehead with 10 sutures in 

place;   healing laceration to left cheek just adjacent to ear with 1 suture in 

place. ).  No: Atraumatic


Mouth: Present: Moist Mucous Membranes


Respiratory/Chest: Present: Clear to Auscultation


Cardiovascular: Present: Regular Rate and Rhythm


Neurological: Present: GCS=15, Speech Normal


Skin: Present: Warm, Dry


Psychiatric: Present: Alert, Oriented x 3





Medical Decision Making


ED Course and Treatment: 





02/12/18 15:29


Patient is nontoxic well-appearing in no distress. Vital signs are stable.








Suture removal: 11 total  sutures removed





Wound healing well without signs of infection





I advised the patient to keep the wound clean and dry apply bacitracin twice 

daily and return if symptoms worsen persist or if new symptoms develop.


I advised follow-up with plastic surgeon within the next 2 days





Patient verbalizes understanding of discharge instructions and need for 

immediate followup.








all aspects of this case were discussed the attending of record. 








Impression: Wound check, suture removal


Keep the wound clean and dry


Apply bacitracin twice daily


Follow up with primary care physician within the next 2 days


Return immediately if symptoms worsen persist or if new symptoms develop; high 

fevers, pain, redness, swelling, purulent discharge. 





Disposition/Present on Arrival





- Present on Arrival


Any Indicators Present on Arrival: No


History of DVT/PE: No


History of Uncontrolled Diabetes: No


Urinary Catheter: No


History of Decub. Ulcer: No


History Surgical Site Infection Following: Orthopedic Procedures, None





- Disposition


Have Diagnosis and Disposition been Completed?: Yes


Diagnosis: 


 Visit for suture removal





Disposition: HOME/ ROUTINE


Disposition Time: 15:31


Patient Plan: Discharge


Condition: GOOD


Discharge Instructions (ExitCare):  Facial Laceration (ED)


Additional Instructions: 


Keep the wound clean and dry


Apply bacitracin twice daily


Follow up with primary care physician within the next 2 days


Return immediately if symptoms worsen persist or if new symptoms develop; high 

fevers, pain, redness, swelling, purulent discharge. 


Referrals: 


Rodrigo Hallman MD [Staff Provider] - Follow up with primary


Lala Cuellar MD [Non-Staff] - Follow up with primary

## 2019-11-20 ENCOUNTER — APPOINTMENT (EMERGENCY)
Dept: CT IMAGING | Facility: HOSPITAL | Age: 24
End: 2019-11-20

## 2019-11-20 ENCOUNTER — HOSPITAL ENCOUNTER (EMERGENCY)
Facility: HOSPITAL | Age: 24
Discharge: HOME | End: 2019-11-20
Attending: EMERGENCY MEDICINE

## 2019-11-20 VITALS
SYSTOLIC BLOOD PRESSURE: 119 MMHG | DIASTOLIC BLOOD PRESSURE: 60 MMHG | HEART RATE: 70 BPM | WEIGHT: 155 LBS | OXYGEN SATURATION: 97 % | RESPIRATION RATE: 18 BRPM | TEMPERATURE: 98.1 F

## 2019-11-20 DIAGNOSIS — G40.919 BREAKTHROUGH SEIZURE (HCC): ICD-10-CM

## 2019-11-20 DIAGNOSIS — R56.9 SEIZURE (HCC): Primary | ICD-10-CM

## 2019-11-20 LAB
ANION GAP SERPL CALCULATED.3IONS-SCNC: 7 MMOL/L (ref 4–13)
BASOPHILS # BLD AUTO: 0.02 THOUSANDS/ΜL (ref 0–0.1)
BASOPHILS NFR BLD AUTO: 0 % (ref 0–1)
BUN SERPL-MCNC: 12 MG/DL (ref 5–25)
CALCIUM SERPL-MCNC: 8.9 MG/DL (ref 8.3–10.1)
CHLORIDE SERPL-SCNC: 106 MMOL/L (ref 100–108)
CO2 SERPL-SCNC: 29 MMOL/L (ref 21–32)
CREAT SERPL-MCNC: 0.96 MG/DL (ref 0.6–1.3)
EOSINOPHIL # BLD AUTO: 0.15 THOUSAND/ΜL (ref 0–0.61)
EOSINOPHIL NFR BLD AUTO: 1 % (ref 0–6)
ERYTHROCYTE [DISTWIDTH] IN BLOOD BY AUTOMATED COUNT: 11.6 % (ref 11.6–15.1)
GFR SERPL CREATININE-BSD FRML MDRD: 126 ML/MIN/1.73SQ M
GLUCOSE SERPL-MCNC: 90 MG/DL (ref 65–140)
HCT VFR BLD AUTO: 40.6 % (ref 36.5–49.3)
HGB BLD-MCNC: 13.4 G/DL (ref 12–17)
IMM GRANULOCYTES # BLD AUTO: 0.03 THOUSAND/UL (ref 0–0.2)
IMM GRANULOCYTES NFR BLD AUTO: 0 % (ref 0–2)
LYMPHOCYTES # BLD AUTO: 1.15 THOUSANDS/ΜL (ref 0.6–4.47)
LYMPHOCYTES NFR BLD AUTO: 10 % (ref 14–44)
MCH RBC QN AUTO: 34.2 PG (ref 26.8–34.3)
MCHC RBC AUTO-ENTMCNC: 33 G/DL (ref 31.4–37.4)
MCV RBC AUTO: 104 FL (ref 82–98)
MONOCYTES # BLD AUTO: 0.65 THOUSAND/ΜL (ref 0.17–1.22)
MONOCYTES NFR BLD AUTO: 5 % (ref 4–12)
NEUTROPHILS # BLD AUTO: 10.1 THOUSANDS/ΜL (ref 1.85–7.62)
NEUTS SEG NFR BLD AUTO: 84 % (ref 43–75)
NRBC BLD AUTO-RTO: 0 /100 WBCS
PLATELET # BLD AUTO: 287 THOUSANDS/UL (ref 149–390)
PMV BLD AUTO: 9.3 FL (ref 8.9–12.7)
POTASSIUM SERPL-SCNC: 3.8 MMOL/L (ref 3.5–5.3)
RBC # BLD AUTO: 3.92 MILLION/UL (ref 3.88–5.62)
SODIUM SERPL-SCNC: 142 MMOL/L (ref 136–145)
WBC # BLD AUTO: 12.1 THOUSAND/UL (ref 4.31–10.16)

## 2019-11-20 PROCEDURE — 96361 HYDRATE IV INFUSION ADD-ON: CPT

## 2019-11-20 PROCEDURE — 80048 BASIC METABOLIC PNL TOTAL CA: CPT | Performed by: EMERGENCY MEDICINE

## 2019-11-20 PROCEDURE — 99284 EMERGENCY DEPT VISIT MOD MDM: CPT

## 2019-11-20 PROCEDURE — 85025 COMPLETE CBC W/AUTO DIFF WBC: CPT | Performed by: EMERGENCY MEDICINE

## 2019-11-20 PROCEDURE — 99284 EMERGENCY DEPT VISIT MOD MDM: CPT | Performed by: EMERGENCY MEDICINE

## 2019-11-20 PROCEDURE — 96365 THER/PROPH/DIAG IV INF INIT: CPT

## 2019-11-20 PROCEDURE — 70450 CT HEAD/BRAIN W/O DYE: CPT

## 2019-11-20 PROCEDURE — 36415 COLL VENOUS BLD VENIPUNCTURE: CPT | Performed by: EMERGENCY MEDICINE

## 2019-11-20 RX ORDER — LEVETIRACETAM 750 MG/1
750 TABLET ORAL 2 TIMES DAILY
Qty: 60 TABLET | Refills: 0 | Status: SHIPPED | OUTPATIENT
Start: 2019-11-20 | End: 2019-12-20

## 2019-11-20 RX ORDER — LEVETIRACETAM 500 MG/1
500 TABLET ORAL EVERY 12 HOURS SCHEDULED
COMMUNITY

## 2019-11-20 RX ADMIN — SODIUM CHLORIDE 1000 ML: 0.9 INJECTION, SOLUTION INTRAVENOUS at 19:26

## 2019-11-20 RX ADMIN — LEVETIRACETAM 1000 MG: 100 INJECTION, SOLUTION INTRAVENOUS at 19:22

## 2019-11-20 NOTE — ED PROVIDER NOTES
History  Chief Complaint   Patient presents with    Seizure - Prior Hx Of     Pt presents via EMS following 1 and a half minute seziure at the grocery store, pt takes keppra and forgot today  History provided by:  Patient and EMS personnel  Seizure - Prior Hx Of   Seizure activity on arrival: no    Seizure type:  Unable to specify  Preceding symptoms comment:  None  Initial focality:  Unable to specify  Episode characteristics: confusion, generalized shaking and unresponsiveness    Postictal symptoms: confusion and somnolence    Return to baseline: yes    Severity:  Unable to specify  Duration:  2 minutes  Timing:  Once  Number of seizures this episode:  1  Progression:  Resolved  Context: medical non-compliance (takes keppra 750mg BID, states he forgot to take it today but has taken it other days)    Context: not alcohol withdrawal, not change in medication and not possible medication ingestion    Recent head injury:  During the event (Was in grocery store shopping and fell and report of hitting his head on the ground)  PTA treatment:  None  History of seizures: yes (last seizure prior was 4 months ago)        Prior to Admission Medications   Prescriptions Last Dose Informant Patient Reported? Taking?   levETIRAcetam (KEPPRA) 500 mg tablet   Yes Yes   Sig: Take 500 mg by mouth every 12 (twelve) hours      Facility-Administered Medications: None       Past Medical History:   Diagnosis Date    Seizures ([facility] [address] )        Past Surgical History:   Procedure Laterality Date    MANDIBLE FRACTURE SURGERY         History reviewed  No pertinent family history  I have reviewed and agree with the history as documented      Social History     Tobacco Use    Smoking status: Never Smoker    Smokeless tobacco: Never Used   Substance Use Topics    Alcohol use: Never     Frequency: Never    Drug use: Yes     Types: Marijuana     Comment: 11/18 last use        Review of Systems   All other systems reviewed and are negative  Physical Exam  Physical Exam   Constitutional: He is oriented to person, place, and time  He appears well-developed and well-nourished  No distress  HENT:   Head: Normocephalic and atraumatic  Nose: Nose normal    Mouth/Throat: Oropharynx is clear and moist    Old scar well-healed to forehead   Eyes: Conjunctivae and EOM are normal    Neck: Normal range of motion  Neck supple  No spinous process tenderness present  No neck rigidity  Normal range of motion present  Cardiovascular: Normal rate, regular rhythm and normal heart sounds  Pulmonary/Chest: Effort normal and breath sounds normal  No respiratory distress  Abdominal: Soft  He exhibits no distension  There is no tenderness  Musculoskeletal: Normal range of motion  He exhibits no edema or deformity  Neurological: He is alert and oriented to person, place, and time  No cranial nerve deficit or sensory deficit  He exhibits normal muscle tone  Skin: Skin is warm and dry  He is not diaphoretic  Psychiatric: He has a normal mood and affect  Nursing note and vitals reviewed        Vital Signs  ED Triage Vitals   Temperature Pulse Respirations Blood Pressure SpO2   11/20/19 1842 11/20/19 1839 11/20/19 1839 11/20/19 1839 11/20/19 1839   98 1 °F (36 7 °C) 92 20 131/78 92 %      Temp Source Heart Rate Source Patient Position - Orthostatic VS BP Location FiO2 (%)   11/20/19 1842 11/20/19 1839 11/20/19 1839 11/20/19 1839 --   Oral Monitor Sitting Right arm       Pain Score       11/20/19 1839       No Pain           Vitals:    11/20/19 1839 11/20/19 1845 11/20/19 2045 11/20/19 2100   BP: 131/78 131/78 127/61 119/60   Pulse: 92 84 68 70   Patient Position - Orthostatic VS: Sitting  Sitting Sitting         Visual Acuity      ED Medications  Medications   sodium chloride 0 9 % bolus 1,000 mL (0 mL Intravenous Stopped 11/20/19 2026)   levETIRAcetam (KEPPRA) 1,000 mg in sodium chloride 0 9 % 100 mL IVPB (0 mg Intravenous Stopped 11/20/19 1954) Diagnostic Studies  Results Reviewed     Procedure Component Value Units Date/Time    Basic metabolic panel [[id number]] Collected:  11/20/19 1955    Lab Status:  Final result Specimen:  Blood from Arm, Left Updated:  11/20/19 2012     Sodium 142 mmol/L      Potassium 3 8 mmol/L      Chloride 106 mmol/L      CO2 29 mmol/L      ANION GAP 7 mmol/L      BUN 12 mg/dL      Creatinine 0 96 mg/dL      Glucose 90 mg/dL      Calcium 8 9 mg/dL      eGFR 126 ml/min/1 73sq m     Narrative:       [place] guidelines for Chronic Kidney Disease (CKD):     Stage 1 with normal or high GFR (GFR > 90 mL/min/1 73 square meters)    Stage 2 Mild CKD (GFR = 60-89 mL/min/1 73 square meters)    Stage 3A Moderate CKD (GFR = 45-59 mL/min/1 73 square meters)    Stage 3B Moderate CKD (GFR = 30-44 mL/min/1 73 square meters)    Stage 4 Severe CKD (GFR = 15-29 mL/min/1 73 square meters)    Stage 5 End Stage CKD (GFR <15 mL/min/1 73 square meters)  Note: GFR calculation is accurate only with a steady state creatinine    CBC and differential [[id number]]  (Abnormal) Collected:  11/20/19 1955    Lab Status:  Final result Specimen:  Blood from Arm, Left Updated:  11/20/19 2001     WBC 12 10 Thousand/uL      RBC 3 92 Million/uL      Hemoglobin 13 4 g/dL      Hematocrit 40 6 %       fL      MCH 34 2 pg      MCHC 33 0 g/dL      RDW 11 6 %      MPV 9 3 fL      Platelets 289 Thousands/uL      nRBC 0 /100 WBCs      Neutrophils Relative 84 %      Immat GRANS % 0 %      Lymphocytes Relative 10 %      Monocytes Relative 5 %      Eosinophils Relative 1 %      Basophils Relative 0 %      Neutrophils Absolute 10 10 Thousands/µL      Immature Grans Absolute 0 03 Thousand/uL      Lymphocytes Absolute 1 15 Thousands/µL      Monocytes Absolute 0 65 Thousand/µL      Eosinophils Absolute 0 15 Thousand/µL      Basophils Absolute 0 02 Thousands/µL                  CT head without contrast   Final Result by Kalee Hare MD (11/20 2235)      No acute intracranial abnormality  Workstation performed: [id number]                    Procedures  Procedures       ED Course                               MDM  Number of Diagnoses or Management Options  Breakthrough seizure [facility]): new and requires workup  Seizure [facility]): new and requires workup     Amount and/or Complexity of Data Reviewed  Clinical lab tests: ordered and reviewed  Tests in the radiology section of CPT®: ordered and reviewed    Risk of Complications, Morbidity, and/or Mortality  Presenting problems: high    Patient Progress  Patient progress: improved      Disposition  Final diagnoses:   Seizure ([facility] [address] )   Breakthrough seizure ([facility] [address] )     Time reflects when diagnosis was documented in both MDM as applicable and the Disposition within this note     Time User Action Codes Description Comment    11/20/2019 10:28 PM Terry Frankel Add [R56 9] Seizure ([facility] [address] )     11/20/2019 10:28 PM Hernando Mata [facility] Breakthrough seizure [facility])       ED Disposition     ED Disposition Condition Date/Time Comment    Discharge Stable Wed Nov 20, 2019 10:29 PM Ginny Man discharge to home/self care  Follow-up Information     Follow up With Specialties Details Why Contact Info Additional [address] [facility] Emergency Department Emergency Medicine Go to  If symptoms worsen [address] [address] ED, [address] [place] [address], [id number]    With your family doctor or neurologist                Patient's Medications   Discharge Prescriptions    No medications on file     No discharge procedures on file      ED Provider  Electronically Signed by           Olivia Rivas MD  11/20/19 6716